# Patient Record
Sex: FEMALE | Race: OTHER
[De-identification: names, ages, dates, MRNs, and addresses within clinical notes are randomized per-mention and may not be internally consistent; named-entity substitution may affect disease eponyms.]

---

## 2020-04-27 ENCOUNTER — HOSPITAL ENCOUNTER (EMERGENCY)
Dept: HOSPITAL 63 - ER | Age: 30
Discharge: HOME | End: 2020-04-27
Payer: OTHER GOVERNMENT

## 2020-04-27 VITALS — WEIGHT: 108.69 LBS | BODY MASS INDEX: 18.56 KG/M2 | HEIGHT: 64 IN

## 2020-04-27 VITALS — SYSTOLIC BLOOD PRESSURE: 94 MMHG | DIASTOLIC BLOOD PRESSURE: 72 MMHG

## 2020-04-27 DIAGNOSIS — F32.9: Primary | ICD-10-CM

## 2020-04-27 DIAGNOSIS — Z88.1: ICD-10-CM

## 2020-04-27 DIAGNOSIS — Z91.5: ICD-10-CM

## 2020-04-27 DIAGNOSIS — Z88.0: ICD-10-CM

## 2020-04-27 PROCEDURE — 99281 EMR DPT VST MAYX REQ PHY/QHP: CPT

## 2020-04-27 NOTE — PHYS DOC
Past History


Past Medical History:  No Pertinent History


Past Surgical History:  No Surgical History


Smoking:  Non-smoker


Alcohol Use:  None


Drug Use:  None





General Adult


EDM:


Chief Complaint:  DEPRESSION





HPI:


HPI:





29-year-old female presents with report of increased depression. Patient reports

history of postpartum depression for which she had been on some Zoloft. Patient 

reports she currently is not taking any medication.  Patient has started to feel

overwhelmed taking care of 3 children including a 11 month old baby.  is 

currently deployed and they are continually extending his appointment. Patient 

does report history of self cutting. Denies any current lacerations. Denies 

suicidal or homicidal ideation. Denies fever or chills. Patient reports she 

currently does not have a family physician.





Review of Systems:


Review of Systems:





Constitutional: Denies fever or chills 


Eyes: Denies redness or eye pain 


HENT: Denies nasal congestion or sore throat


Respiratory: Denies cough or shortness of breath 


Cardiovascular: Denies chest pain or palpitations


GI: Denies abdominal pain, nausea, or vomiting


: Denies dysuria or hematuria


Musculoskeletal: Denies back pain or joint pain


Integument: Denies rash or skin lesions 


Neurologic: Denies headache, focal weakness or sensory changes





Complete systems were reviewed and found to be within normal limits, except as 

documented in this note.





Allergies:


Allergies:





Allergies








Coded Allergies Type Severity Reaction Last Updated Verified


 


  Penicillins Allergy Intermediate  1/2/19 Yes


 


  amoxicillin Allergy Intermediate  1/2/19 Yes











Physical Exam:


PE:





Constitutional: Well developed, well nourished, no acute distress, non-toxic 

appearance


HENT: Normocephalic, atraumatic, oropharynx moist


Eyes: PERRL, EOMI, conjunctiva normal, no discharge, no nystagmus


Neck: Normal range of motion, no tenderness, supple


Cardiovascular: Heart rate normal, regular rhythm


Lungs & Thorax:  Bilateral breath sounds clear to auscultation, no wheezing


Abdomen: Soft, no tenderness


Skin: Warm, dry, no erythema, no rash


Back: No tenderness, no CVA tenderness


Extremities: No tenderness, ROM intact, no edema


Neurologic: Alert and oriented X 3, normal motor function, normal sensory 

function, no focal deficits noted


Psychologic: Affect normal, judgment normal, denies suicidal or homicidal 

ideation





EKG:


EKG:


[]





Radiology/Procedures:


Radiology/Procedures:


[]





Course & Med Decision Making:


Course & Med Decision Making





Patient presents with history of present illness and physical exam consistent 

for depression. Patient denies suicidal or homicidal ideation. Patient 

neurologically intact. Physical exam unremarkable. 





Outpatient resources provided with PCP list and [facility] information.





Patient stable for discharge with outpatient follow-up with PCP/mental health. 

Discussed findings and plan with patient, who acknowledges understanding and 

agreement.





Dragon Disclaimer:


Dragon Disclaimer:


This electronic medical record was generated, in whole or in part, using a voice

 recognition dictation system.





Departure


Departure:


Impression:  


   Primary Impression:  


   Depression


   Qualified Codes:  F32.9 - Major depressive disorder, single episode, 

   unspecified


Disposition:  01 HOME, SELF-CARE


Condition:  STABLE


Referrals:  


PCP,NO (PCP)


Patient Instructions:  Depression, Adult, Easy-to-Read





Additional Instructions:  


Please follow up closely with Mental Health- [facility] referral provided.





Please call and make an appointment and establish care with a family physician.











REEMA MACEDO DO             Apr 27, 2020 10:49

## 2020-07-24 ENCOUNTER — HOSPITAL ENCOUNTER (EMERGENCY)
Dept: HOSPITAL 63 - ER | Age: 30
Discharge: HOME | End: 2020-07-24
Payer: OTHER GOVERNMENT

## 2020-07-24 VITALS — SYSTOLIC BLOOD PRESSURE: 100 MMHG | DIASTOLIC BLOOD PRESSURE: 66 MMHG

## 2020-07-24 VITALS — BODY MASS INDEX: 18.74 KG/M2 | HEIGHT: 64 IN | WEIGHT: 109.79 LBS

## 2020-07-24 DIAGNOSIS — Z88.0: ICD-10-CM

## 2020-07-24 DIAGNOSIS — N92.0: Primary | ICD-10-CM
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
## 2020-07-24 DIAGNOSIS — R55: ICD-10-CM
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
## 2020-07-24 DIAGNOSIS — Z88.1: ICD-10-CM
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
## 2020-07-24 LAB
ALBUMIN SERPL-MCNC: 4 G/DL (ref 3.4–5)
ALBUMIN/GLOB SERPL: 1.1 {RATIO} (ref 1–1.7)
ALP SERPL-CCNC: 100 U/L (ref 46–116)
ALT SERPL-CCNC: 22 U/L (ref 14–59)
ANION GAP SERPL CALC-SCNC: 9 MMOL/L (ref 6–14)
APTT PPP: YELLOW S
AST SERPL-CCNC: 15 U/L (ref 15–37)
BACTERIA #/AREA URNS HPF: (no result) /HPF
BASOPHILS # BLD AUTO: 0 X10^3/UL (ref 0–0.2)
BASOPHILS NFR BLD: 1 % (ref 0–3)
BILIRUB SERPL-MCNC: 0.3 MG/DL (ref 0.2–1)
BILIRUB UR QL STRIP: (no result)
BUN/CREAT SERPL: 19 (ref 6–20)
CA-I SERPL ISE-MCNC: 23 MG/DL (ref 7–20)
CALCIUM SERPL-MCNC: 9.1 MG/DL (ref 8.5–10.1)
CHLORIDE SERPL-SCNC: 103 MMOL/L (ref 98–107)
CO2 SERPL-SCNC: 26 MMOL/L (ref 21–32)
CREAT SERPL-MCNC: 1.2 MG/DL (ref 0.6–1)
EOSINOPHIL NFR BLD: 0.1 X10^3/UL (ref 0–0.7)
EOSINOPHIL NFR BLD: 2 % (ref 0–3)
ERYTHROCYTE [DISTWIDTH] IN BLOOD BY AUTOMATED COUNT: 12.5 % (ref 11.5–14.5)
FIBRINOGEN PPP-MCNC: (no result) MG/DL
GFR SERPLBLD BASED ON 1.73 SQ M-ARVRAT: 52.1 ML/MIN
GLOBULIN SER-MCNC: 3.8 G/DL (ref 2.2–3.8)
GLUCOSE SERPL-MCNC: 126 MG/DL (ref 70–99)
GLUCOSE UR STRIP-MCNC: (no result) MG/DL
HCT VFR BLD CALC: 41.6 % (ref 36–47)
HGB BLD-MCNC: 14.1 G/DL (ref 12–15.5)
LYMPHOCYTES # BLD: 2.6 X10^3/UL (ref 1–4.8)
LYMPHOCYTES NFR BLD AUTO: 40 % (ref 24–48)
MCH RBC QN AUTO: 31 PG (ref 25–35)
MCHC RBC AUTO-ENTMCNC: 34 G/DL (ref 31–37)
MCV RBC AUTO: 91 FL (ref 79–100)
MONO #: 0.5 X10^3/UL (ref 0–1.1)
MONOCYTES NFR BLD: 7 % (ref 0–9)
NEUT #: 3.4 X10^3UL (ref 1.8–7.7)
NEUTROPHILS NFR BLD AUTO: 51 % (ref 31–73)
NITRITE UR QL STRIP: (no result)
PLATELET # BLD AUTO: 217 X10^3/UL (ref 140–400)
POTASSIUM SERPL-SCNC: 3.5 MMOL/L (ref 3.5–5.1)
PROT SERPL-MCNC: 7.8 G/DL (ref 6.4–8.2)
RBC # BLD AUTO: 4.59 X10^6/UL (ref 3.5–5.4)
SODIUM SERPL-SCNC: 138 MMOL/L (ref 136–145)
SP GR UR STRIP: 1.02
SQUAMOUS #/AREA URNS LPF: (no result) /LPF
U PREG PATIENT: NEGATIVE
UROBILINOGEN UR-MCNC: 1 MG/DL
WBC # BLD AUTO: 6.7 X10^3/UL (ref 4–11)

## 2020-07-24 PROCEDURE — 81001 URINALYSIS AUTO W/SCOPE: CPT

## 2020-07-24 PROCEDURE — 99283 EMERGENCY DEPT VISIT LOW MDM: CPT

## 2020-07-24 PROCEDURE — 85025 COMPLETE CBC W/AUTO DIFF WBC: CPT

## 2020-07-24 PROCEDURE — 36415 COLL VENOUS BLD VENIPUNCTURE: CPT

## 2020-07-24 PROCEDURE — 87086 URINE CULTURE/COLONY COUNT: CPT

## 2020-07-24 PROCEDURE — 81025 URINE PREGNANCY TEST: CPT

## 2020-07-24 PROCEDURE — 80053 COMPREHEN METABOLIC PANEL: CPT

## 2020-07-24 NOTE — PHYS DOC
Past History


Past Medical History:  Depression, Other


Additional Past Medical Histor:  POST-PARTUM DEPRESSION


Past Surgical History:  Tubal ligation


Smoking:  Non-smoker


Alcohol Use:  None


Drug Use:  None





General Adult


EDM:


Chief Complaint:  VAGINAL BLEEDING





HPI:


HPI:


30-year-old female presents with heavy vaginal bleeding and syncopal episode.  

The patient is due to start her menses any day.  She started yesterday.  It is 

much heavier than usual.  She is going through a tampon and pad about every 

hour.  That is continued to today.  She was feeling lightheaded and fatigued 

earlier today.  She started to walk toward the door and fell forward onto the 

floor.  She did not lose consciousness because she can still hear things, but 

did have a short blacking out visually.  She rapidly came back to baseline.  

This was concerning so she decided come to emergency room.  She denies shortness

of breath, cough, chest pain, dysuria, increased urinary frequency.  Patient has

5 living children.  She had one miscarriage in the past.  She has not been 

sexually active because her  is out of state for the .





Review of Systems:


Review of Systems:


Constitutional: Fatigue.  Denies fever or chills 


Eyes:  Denies change in visual acuity 


HENT:  Denies nasal congestion or sore throat 


Respiratory:  Denies cough or shortness of breath 


Cardiovascular:  Denies chest pain or edema 


GI:  Denies abdominal pain, nausea, vomiting, bloody stools or diarrhea 


: Vaginal bleeding


Musculoskeletal:  Denies back pain or joint pain 


Integument:  Denies rash 


Neurologic:  Denies headache, focal weakness or sensory changes 


Endocrine:  Denies polyuria or polydipsia 


Lymphatic:  Denies swollen glands 


Psychiatric:  Denies depression or anxiety





Heart Score:


Risk Factors:


Risk Factors:  DM, Current or recent (<one month) smoker, HTN, HLP, family 

history of CAD, obesity.


Risk Scores:


Score 0 - 3:  2.5% MACE over next 6 weeks - Discharge Home


Score 4 - 6:  20.3% MACE over next 6 weeks - Admit for Clinical Observation


Score 7 - 10:  72.7% MACE over next 6 weeks - Early Invasive Strategies





Allergies:


Allergies:





Allergies








Coded Allergies Type Severity Reaction Last Updated Verified


 


  Penicillins Allergy Intermediate  1/2/19 Yes


 


  amoxicillin Allergy Intermediate  1/2/19 Yes











Physical Exam:


PE:





Constitutional: Well developed, thin, well nourished, no acute distress, non-

toxic appearance. []


HENT: Normocephalic, atraumatic, bilateral external ears normal, oropharynx 

moist, no oral exudates, nose normal. []


Eyes: PERRLA, EOMI, conjunctiva normal, no discharge. [] 


Neck: Normal range of motion, no tenderness, supple, no stridor. [] 


Cardiovascular:Heart rate regular rhythm, no murmur []


Lungs & Thorax:  Bilateral breath sounds clear to auscultation []


Abdomen: Bowel sounds normal, soft, no tenderness, no masses, no pulsatile 

masses. [] 


Skin: Warm, dry, no erythema, no rash. [] 


Back: No tenderness, no CVA tenderness. [] 


Extremities: No tenderness, no cyanosis, no clubbing, ROM intact, no edema. [] 


Neurologic: Alert and oriented X 3, normal motor function, normal sensory 

function, no focal deficits noted. []


Psychologic: Affect normal, judgement normal, mood normal. []





EKG:


EKG:


[]





Radiology/Procedures:


Radiology/Procedures:


[]





Course & Med Decision Making:


Course & Med Decision Making


Pertinent Labs and Imaging studies reviewed. (See chart for details)


The patient's labs are unremarkable.  This is likely just a heavy cycle for the 

patient.  I have advised that she wait and see how it goes over the next 24 to 

48 hours.  She is not anemic.  I have advised that she stay hydrated.  If her 

condition worsens or other concerning symptoms develop, she can return to the 

emergency room.  She is stable for discharge at this time.


[]





Dragon Disclaimer:


Dragon Disclaimer:


This electronic medical record was generated, in whole or in part, using a voice

 recognition dictation system.





Departure


Departure:


Impression:  


   Primary Impression:  


   Heavy menstrual bleeding


   Qualified Codes:  N92.0 - Excessive and frequent menstruation with regular 

   cycle


Disposition:  01 HOME/RESIDENCE PRIOR TO ADM


Condition:  STABLE


Referrals:  


PCP,NO (PCP)


Patient Instructions:  Menorrhagia, Easy-to-Read





Justification of Admission:


Justification of Admission:


Justification of Admission Dx:  N/A











JOSE GUADALUPE MACK DO                 Jul 24, 2020 17:37

## 2020-08-24 ENCOUNTER — HOSPITAL ENCOUNTER (EMERGENCY)
Dept: HOSPITAL 63 - ER | Age: 30
Discharge: HOME | End: 2020-08-24
Payer: OTHER GOVERNMENT

## 2020-08-24 VITALS — BODY MASS INDEX: 18.74 KG/M2 | WEIGHT: 109.79 LBS | HEIGHT: 64 IN

## 2020-08-24 VITALS — DIASTOLIC BLOOD PRESSURE: 66 MMHG | SYSTOLIC BLOOD PRESSURE: 112 MMHG

## 2020-08-24 DIAGNOSIS — Z88.0: ICD-10-CM

## 2020-08-24 DIAGNOSIS — Z88.1: ICD-10-CM

## 2020-08-24 DIAGNOSIS — N39.0: Primary | ICD-10-CM

## 2020-08-24 DIAGNOSIS — Z87.440: ICD-10-CM

## 2020-08-24 LAB
APTT PPP: YELLOW S
BACTERIA #/AREA URNS HPF: (no result) /HPF
BILIRUB UR QL STRIP: (no result)
FIBRINOGEN PPP-MCNC: (no result) MG/DL
GLUCOSE UR STRIP-MCNC: (no result) MG/DL
NITRITE UR QL STRIP: (no result)
RBC #/AREA URNS HPF: 0 /HPF (ref 0–2)
SP GR UR STRIP: 1.02
UROBILINOGEN UR-MCNC: 1 MG/DL
WBC #/AREA URNS HPF: (no result) /HPF (ref 0–4)

## 2020-08-24 PROCEDURE — 81001 URINALYSIS AUTO W/SCOPE: CPT

## 2020-08-24 PROCEDURE — 99283 EMERGENCY DEPT VISIT LOW MDM: CPT

## 2020-08-24 PROCEDURE — 81025 URINE PREGNANCY TEST: CPT

## 2020-08-24 PROCEDURE — 87086 URINE CULTURE/COLONY COUNT: CPT

## 2020-08-24 NOTE — PHYS DOC
Past History


Past Medical History:  Other


Additional Past Medical Histor:  POST-PARTUM DEPRESSION; used to self mutilate 

but not recently


Past Surgical History:  , Other


Additional Past Surgical Histo:  destin salpingectomy


Smoking:  Non-smoker


Alcohol Use:  Occasionally


Drug Use:  None





Adult General


Chief Complaint


Chief Complaint:  PAIN ON URINATION





HPI


HPI





Patient is a 30-year-old female presenting for UTI-like symptoms.  Patient 

reports her  who was in the  recently returned from long-term 

deployment.  She admits being sexually active recently and has had feelings of 

UTI-like symptoms such as increased urinary frequency and dysuria.  Denies any 

vaginal discharge, vaginal bleeding, or fever.  Patient has had UTIs in the past

and reports this feels identical to previous





Review of Systems


Review of Systems


Fourteen body systems of review of systems have been reviewed. See HPI for 

pertinent positives and negative responses, other wise all other systems are 

negative, non-pertinent or non-contributory





Allergies


Allergies





Allergies








Coded Allergies Type Severity Reaction Last Updated Verified


 


  Penicillins Allergy Intermediate  20 Yes


 


  amoxicillin Allergy Intermediate  20 Yes











Physical Exam


Physical Exam


Constitutional: Well developed, well nourished, no acute distress, non-toxic 

appearance. 


HENT: Normocephalic, atraumatic, bilateral external ears normal, oropharynx 

moist, no oral exudates, nose normal. 


Eyes: PERRLA, EOMI, conjunctiva normal, no discharge.  


Neck: Normal range of motion, no tenderness, supple, no stridor.  


Cardiovascular: Heart rate regular, sinus rhythm, no murmurs rubs or gallops


Lungs & Thorax:  Bilateral breath sounds clear to auscultation 


Abdomen: Bowel sounds normal, soft, mild suprapubic tenderness, no guarding, no 

rebound, no masses, no pulsatile masses.  Nonsurgical abdomen, no peritoneal 

signs


Skin: Warm, dry, no erythema, no rash.  


Back: No tenderness, no CVA tenderness.  


Extremities: No tenderness, no cyanosis, no clubbing, ROM intact, no edema.  


Neurologic: Alert and oriented X 3, grossly normal motor & sensory function, no 

focal deficits noted. 


Psychologic: Affect normal, judgement normal, mood normal.





Current Patient Data


Vital Signs





                                   Vital Signs








  Date Time  Temp Pulse Resp B/P (MAP) Pulse Ox O2 Delivery O2 Flow Rate FiO2


 


20 20:40 98.5 78 18 112/66 (81) 100 Room Air  








Lab Results





                                Laboratory Tests








Test


 20


20:50


 


Urine Collection Type Void  


 


Urine Color Yellow  


 


Urine Clarity Hazy  


 


Urine pH 7.0  


 


Urine Specific Gravity 1.025  


 


Urine Protein


 Neg


(NEG-TRACE)


 


Urine Glucose (UA)


 Neg mg/dL


(NEG)


 


Urine Ketones (Stick)


 Neg mg/dL


(NEG)


 


Urine Blood Trace (NEG)  


 


Urine Nitrite Neg (NEG)  


 


Urine Bilirubin Neg (NEG)  


 


Urine Urobilinogen Dipstick


 1.0 mg/dL (0.2


mg/dL)


 


Urine Leukocyte Esterase Small (NEG)  


 


Urine RBC 0 /HPF (0-2)  


 


Urine WBC


 20-40 /HPF


(0-4)


 


Urine Bacteria


 Few /HPF


(0-FEW)











EKG


EKG


[]





Radiology/Procedures


Radiology/Procedures


[]





Course & Med Decision Making


Course & Med Decision Making


ABCs unremarkable


Comprehensive history and physical exam obtained, pertinent diagnostic work-up 

obtain


Clinically well-appearing patient with history and findings consistent with 

simple cystitis


Discussed most likely diagnosis of simple UTI, discussed treatment with Macrobid

 and close PCP follow-up, patient reports having previously scheduled 

appointment with PCP in upcoming 72 hours


Strict return precautions discussed with good understanding by patient, all 

questions and concerns addressed prior to ER departure





Dragon Disclaimer


Dragon Disclaimer


This electronic medical record was generated, in whole or in part, using a voice

 recognition dictation system.





Departure


Departure:


Impression:  


   Primary Impression:  


   UTI (urinary tract infection)


Disposition:  01 HOME/RESIDENCE PRIOR TO ADM


Condition:  STABLE


Referrals:  


LYNNE JIN MD (PCP)


Patient Instructions:  Urinary Tract Infection





Additional Instructions:  


As discussed prior to ER departure, please follow-up with your PCP this upcoming

 Thursday as previously scheduled


You have been treated for an uncomplicated UTI today, there is low suspicion for

 other potentially infectious causes at this time.


Please review your ER course with your primary care physician, discuss your 

symptomology whether it has fully resolved or not, there might be an indication 

for further diagnostic work-up such as pelvic exam etc.


Strict return precautions were discussed prior to your discharge, if any of 

these concerning signs or symptoms such as fever, chills, vaginal discharge, 

vaginal bleeding etc. arise, please call your PCP or re-present to our ER for 

formal evaluation and further medical intervention


It was a pleasure to take care of you and I wish you a speedy recovery!


Scripts


Nitrofurantoin Monohyd/M-Cryst (MACROBID 100 MG CAPSULE) 100 Mg Capsule


1 CAP PO BID for UTI for 5 Days, #9 CAP 0 Refills


   Prov: SHERRI DICKSON DO         20





Justification of Admission:


Justification of Admission:


Justification of Admission Dx:  N/A











SHERRI DICKSON DO                 Aug 24, 2020 21:57

## 2020-10-15 ENCOUNTER — HOSPITAL ENCOUNTER (EMERGENCY)
Dept: HOSPITAL 63 - ER | Age: 30
LOS: 1 days | Discharge: HOME | End: 2020-10-16
Payer: OTHER GOVERNMENT

## 2020-10-15 ENCOUNTER — HOSPITAL ENCOUNTER (EMERGENCY)
Dept: HOSPITAL 63 - ER | Age: 30
Discharge: HOME | End: 2020-10-15
Payer: OTHER GOVERNMENT

## 2020-10-15 VITALS — HEIGHT: 64 IN | BODY MASS INDEX: 18.07 KG/M2 | WEIGHT: 105.82 LBS

## 2020-10-15 VITALS — HEIGHT: 64 IN | WEIGHT: 105.82 LBS | BODY MASS INDEX: 18.07 KG/M2

## 2020-10-15 VITALS — DIASTOLIC BLOOD PRESSURE: 71 MMHG | SYSTOLIC BLOOD PRESSURE: 110 MMHG

## 2020-10-15 DIAGNOSIS — N12: Primary | ICD-10-CM

## 2020-10-15 DIAGNOSIS — K52.9: Primary | ICD-10-CM

## 2020-10-15 DIAGNOSIS — Z98.890: ICD-10-CM

## 2020-10-15 DIAGNOSIS — Z88.0: ICD-10-CM

## 2020-10-15 DIAGNOSIS — R30.0: ICD-10-CM

## 2020-10-15 DIAGNOSIS — F17.200: ICD-10-CM

## 2020-10-15 DIAGNOSIS — Z88.1: ICD-10-CM

## 2020-10-15 DIAGNOSIS — Z98.51: ICD-10-CM

## 2020-10-15 LAB
ALBUMIN SERPL-MCNC: 4.1 G/DL (ref 3.4–5)
ALBUMIN/GLOB SERPL: 1 {RATIO} (ref 1–1.7)
ALP SERPL-CCNC: 79 U/L (ref 46–116)
ALT SERPL-CCNC: 21 U/L (ref 14–59)
AMPHETAMINE/METHAMPHETAMINE: (no result)
ANION GAP SERPL CALC-SCNC: 12 MMOL/L (ref 6–14)
APTT PPP: YELLOW S
AST SERPL-CCNC: 15 U/L (ref 15–37)
BACTERIA #/AREA URNS HPF: 0 /HPF
BARBITURATES UR-MCNC: (no result) UG/ML
BASOPHILS # BLD AUTO: 0 X10^3/UL (ref 0–0.2)
BASOPHILS NFR BLD: 0 % (ref 0–3)
BENZODIAZ UR-MCNC: (no result) UG/L
BILIRUB SERPL-MCNC: 0.6 MG/DL (ref 0.2–1)
BILIRUB UR QL STRIP: (no result)
BUN/CREAT SERPL: 17 (ref 6–20)
CA-I SERPL ISE-MCNC: 17 MG/DL (ref 7–20)
CALCIUM SERPL-MCNC: 9.3 MG/DL (ref 8.5–10.1)
CANNABINOIDS UR-MCNC: (no result) UG/L
CHLORIDE SERPL-SCNC: 101 MMOL/L (ref 98–107)
CO2 SERPL-SCNC: 23 MMOL/L (ref 21–32)
COCAINE UR-MCNC: (no result) NG/ML
CREAT SERPL-MCNC: 1 MG/DL (ref 0.6–1)
EOSINOPHIL NFR BLD: 0 % (ref 0–3)
EOSINOPHIL NFR BLD: 0 X10^3/UL (ref 0–0.7)
ERYTHROCYTE [DISTWIDTH] IN BLOOD BY AUTOMATED COUNT: 12.1 % (ref 11.5–14.5)
FIBRINOGEN PPP-MCNC: CLEAR MG/DL
GFR SERPLBLD BASED ON 1.73 SQ M-ARVRAT: 64.3 ML/MIN
GLOBULIN SER-MCNC: 4.1 G/DL (ref 2.2–3.8)
GLUCOSE SERPL-MCNC: 85 MG/DL (ref 70–99)
GLUCOSE UR STRIP-MCNC: (no result) MG/DL
HCT VFR BLD CALC: 42 % (ref 36–47)
HGB BLD-MCNC: 14 G/DL (ref 12–15.5)
LYMPHOCYTES # BLD: 0.7 X10^3/UL (ref 1–4.8)
LYMPHOCYTES NFR BLD AUTO: 9 % (ref 24–48)
MAGNESIUM SERPL-MCNC: 1.9 MG/DL (ref 1.8–2.4)
MCH RBC QN AUTO: 31 PG (ref 25–35)
MCHC RBC AUTO-ENTMCNC: 33 G/DL (ref 31–37)
MCV RBC AUTO: 92 FL (ref 79–100)
METHADONE SERPL-MCNC: (no result) NG/ML
MONO #: 0.4 X10^3/UL (ref 0–1.1)
MONOCYTES NFR BLD: 5 % (ref 0–9)
NEUT #: 7 X10^3UL (ref 1.8–7.7)
NEUTROPHILS NFR BLD AUTO: 86 % (ref 31–73)
NITRITE UR QL STRIP: (no result)
OPIATES UR-MCNC: (no result) NG/ML
PCP SERPL-MCNC: (no result) MG/DL
PLATELET # BLD AUTO: 194 X10^3/UL (ref 140–400)
POTASSIUM SERPL-SCNC: 3.6 MMOL/L (ref 3.5–5.1)
PROT SERPL-MCNC: 8.2 G/DL (ref 6.4–8.2)
RBC # BLD AUTO: 4.59 X10^6/UL (ref 3.5–5.4)
RBC #/AREA URNS HPF: (no result) /HPF (ref 0–2)
SODIUM SERPL-SCNC: 136 MMOL/L (ref 136–145)
SP GR UR STRIP: 1.02
SQUAMOUS #/AREA URNS LPF: (no result) /LPF
U PREG PATIENT: NEGATIVE
UROBILINOGEN UR-MCNC: 0.2 MG/DL
WBC # BLD AUTO: 8.1 X10^3/UL (ref 4–11)
WBC #/AREA URNS HPF: (no result) /HPF (ref 0–4)

## 2020-10-15 PROCEDURE — 80307 DRUG TEST PRSMV CHEM ANLYZR: CPT

## 2020-10-15 PROCEDURE — 83735 ASSAY OF MAGNESIUM: CPT

## 2020-10-15 PROCEDURE — 96365 THER/PROPH/DIAG IV INF INIT: CPT

## 2020-10-15 PROCEDURE — 99284 EMERGENCY DEPT VISIT MOD MDM: CPT

## 2020-10-15 PROCEDURE — 82550 ASSAY OF CK (CPK): CPT

## 2020-10-15 PROCEDURE — 81001 URINALYSIS AUTO W/SCOPE: CPT

## 2020-10-15 PROCEDURE — 96375 TX/PRO/DX INJ NEW DRUG ADDON: CPT

## 2020-10-15 PROCEDURE — 96374 THER/PROPH/DIAG INJ IV PUSH: CPT

## 2020-10-15 PROCEDURE — 99285 EMERGENCY DEPT VISIT HI MDM: CPT

## 2020-10-15 PROCEDURE — 36415 COLL VENOUS BLD VENIPUNCTURE: CPT

## 2020-10-15 PROCEDURE — 83690 ASSAY OF LIPASE: CPT

## 2020-10-15 PROCEDURE — 96361 HYDRATE IV INFUSION ADD-ON: CPT

## 2020-10-15 PROCEDURE — 83605 ASSAY OF LACTIC ACID: CPT

## 2020-10-15 PROCEDURE — 81025 URINE PREGNANCY TEST: CPT

## 2020-10-15 PROCEDURE — 85025 COMPLETE CBC W/AUTO DIFF WBC: CPT

## 2020-10-15 PROCEDURE — 87086 URINE CULTURE/COLONY COUNT: CPT

## 2020-10-15 PROCEDURE — 80053 COMPREHEN METABOLIC PANEL: CPT

## 2020-10-15 PROCEDURE — 74177 CT ABD & PELVIS W/CONTRAST: CPT

## 2020-10-15 NOTE — RAD
EXAM: CT ABDOMEN/PELVIS WITH CONTRAST.

 

HISTORY: Urinary tract infection, flank pain, vomiting.

 

TECHNIQUE: Computed tomography of the abdomen and pelvis was performed 

after the intravenous administration of iodinated contrast. One or more of

the following individualized dose reduction techniques were utilized for 

this examination:  

1. Automated exposure control.  

2. Adjustment of the mA and/or kV according to patient size.  

3. Use of iterative reconstruction technique.

 

COMPARISON: None.

 

FINDINGS: Lung windows through the visualized portions of the bases reveal

no abnormality. Bone windows reveal no suspicious lesions. There is a 

moderate pectus excavatum deformity. There is some mass effect on the 

right ventricle.

 

The liver, gallbladder, spleen, pancreas and adrenal glands are 

unremarkable. There are multiple bilateral renal calculi measuring up to 7

mm on the left and 3 mm on the right. Subcentimeter cysts bilaterally 

appear benign. There is mild bilateral cortical scarring versus persistent

fetal lobulation.

 

Wall thickening of the right colon is consistent with colitis. The left 

colon does not appear involved. A small amount of free pelvic fluid is 

likely physiologic. A myometrial fibroid at the uterine fundus measures 

1.3 cm. The appendix is not inflamed. There is no small bowel obstruction.

 

The central mesenteric vasculature is widely patent.

 

IMPRESSION: 

1. At least moderate right colitis. Infectious, inflammatory or ischemic 

etiologies could be considered in this distribution.

2. Multiple bilateral renal calculi measure up to 7 mm on the left.

3. 1.2 cm myometrial fibroid at the uterine fundus.

4. A moderate pectus excavatum deformity with some mass effect on the 

right ventricle.

 

Electronically signed by: DAVID Mccoy MD (10/15/2020 11:16 PM) 

[facility]

## 2020-10-15 NOTE — PHYS DOC
Past History


Past Medical History:  Other


Additional Past Medical Histor:  HEART MURMOR


Past Surgical History:  , Tubal ligation, Other


Additional Past Surgical Histo:  UMBRELLA IN HEART


Smoking:  Non-smoker


Alcohol Use:  None


Drug Use:  None





General Adult


EDM:


Chief Complaint:  PAIN ON URINATION





HPI:


HPI:


30-year-old female presents with vomiting.  She was seen earlier today by my 

colleague and diagnosed with a urinary tract infection.  The patient had joint 

pain which is now resolved.  She went home and she is unable to keep down 

anything including fluids.  She tried her Zofran prescription and it has not 

worked.  She does not have a fever.  She is just worried that her urinary tract 

infection is worse than she thought might need to be admitted for vomiting.  She

would prefer not to be admitted but is worried about not being able to keep down

fluids.  See note from same day for more details of her previous visit.





Review of Systems:


Review of Systems:


Constitutional:  Denies fever or chills 


Eyes:  Denies change in visual acuity 


HENT:  Denies nasal congestion or sore throat 


Respiratory:  Denies cough or shortness of breath 


Cardiovascular:  Denies chest pain or edema 


GI:  nausea, vomiting.  Denies abdominal pain, bloody stools or diarrhea 


: Dysuria


Musculoskeletal: Right flank pain


Integument:  Denies rash 


Neurologic:  Denies headache, focal weakness or sensory changes 


Endocrine:  Denies polyuria or polydipsia 


Lymphatic:  Denies swollen glands 


Psychiatric:  Denies depression or anxiety





Heart Score:


Risk Factors:


Risk Factors:  DM, Current or recent (<one month) smoker, HTN, HLP, family 

history of CAD, obesity.


Risk Scores:


Score 0 - 3:  2.5% MACE over next 6 weeks - Discharge Home


Score 4 - 6:  20.3% MACE over next 6 weeks - Admit for Clinical Observation


Score 7 - 10:  72.7% MACE over next 6 weeks - Early Invasive Strategies





Allergies:


Allergies:





Allergies








Coded Allergies Type Severity Reaction Last Updated Verified


 


  Penicillins Allergy Intermediate  20 Yes


 


  amoxicillin Allergy Intermediate  20 Yes











Physical Exam:


PE:





Constitutional: Well developed, well nourished, no acute distress, non-toxic 

appearance. []


HENT: Normocephalic, atraumatic, bilateral external ears normal, oropharynx 

moist, no oral exudates, nose normal. []


Eyes: PERRLA, EOMI, conjunctiva normal, no discharge. [] 


Neck: Normal range of motion, no tenderness, supple, no stridor. [] 


Cardiovascular:Heart rate regular rhythm, no murmur []


Lungs & Thorax:  Bilateral breath sounds clear to auscultation []


Abdomen: Bowel sounds normal, soft, no tenderness, no masses, no pulsatile 

masses. [] 


Skin: Warm, dry, no erythema, no rash. [] 


Back: No tenderness, no CVA tenderness. [] 


Extremities: No tenderness, no cyanosis, no clubbing, ROM intact, no edema. [] 


Neurologic: Alert and oriented X 3, normal motor function, normal sensory 

function, no focal deficits noted. []


Psychologic: Affect normal, judgement normal, mood normal. []





EKG:


EKG:


[]





Radiology/Procedures:


Radiology/Procedures:


[]


Impressions:


EXAM: CT ABDOMEN/PELVIS WITH CONTRAST.


 


HISTORY: Urinary tract infection, flank pain, vomiting.


 


TECHNIQUE: Computed tomography of the abdomen and pelvis was performed 


after the intravenous administration of iodinated contrast. One or more of


the following individualized dose reduction techniques were utilized for 


this examination:  


1. Automated exposure control.  


2. Adjustment of the mA and/or kV according to patient size.  


3. Use of iterative reconstruction technique.


 


COMPARISON: None.


 


FINDINGS: Lung windows through the visualized portions of the bases reveal


no abnormality. Bone windows reveal no suspicious lesions. There is a 


moderate pectus excavatum deformity. There is some mass effect on the 


right ventricle.


 


The liver, gallbladder, spleen, pancreas and adrenal glands are 


unremarkable. There are multiple bilateral renal calculi measuring up to 7


mm on the left and 3 mm on the right. Subcentimeter cysts bilaterally 


appear benign. There is mild bilateral cortical scarring versus persistent


fetal lobulation.


 


Wall thickening of the right colon is consistent with colitis. The left 


colon does not appear involved. A small amount of free pelvic fluid is 


likely physiologic. A myometrial fibroid at the uterine fundus measures 


1.3 cm. The appendix is not inflamed. There is no small bowel obstruction.


 


The central mesenteric vasculature is widely patent.


 


IMPRESSION: 


1. At least moderate right colitis. Infectious, inflammatory or ischemic 


etiologies could be considered in this distribution.


2. Multiple bilateral renal calculi measure up to 7 mm on the left.


3. 1.2 cm myometrial fibroid at the uterine fundus.


4. A moderate pectus excavatum deformity with some mass effect on the 


right ventricle.


 


Electronically signed by: DAVID Mccoy MD (10/15/2020 11:16 PM) 


[facility]














DICTATED AND SIGNED BY:     PRUDENCE MCCOY MD


DATE:     10/15/20 6659





CC: JOSE GUADALUPE MACK DO; LYNNE JIN MD ~





Course & Med Decision Making:


Course & Med Decision Making


Pertinent Labs and Imaging studies reviewed. (See chart for details)


The patient did not have a CT scan earlier today so I have ordered this.  I will

 try Compazine and Benadryl for her vomiting.  Her penicillin allergy is a rash 

and it was many years ago.  I will give her a gram of Rocephin IV for her 

urinary tract infection.  Patient CT scan showed a right colitis.  The patient's

 labs from earlier today are unremarkable.  I have ordered a lactic acid.  

Lactic acid is 0.7.  Believe this is most likely infectious.  I will treat her 

with 7 days of Augmentin.  The patient told me her penicillin allergy was 

decades ago and was just a rash.  She has had medications with penicillin and it

 and had no difficulty.  Have advised that she continue her Bactrim as well.  

She is stable for discharge at this time.  If her condition worsens or new 

symptoms develop, the patient will return to the emergency room for admission 

and further treatment.


[]





Dragon Disclaimer:


Dragon Disclaimer:


This electronic medical record was generated, in whole or in part, using a voice

 recognition dictation system.





Departure


Departure:


Impression:  


   Primary Impression:  


   Colitis


Disposition:  01 DC HOME SELF CARE/HOMELESS


Condition:  STABLE


Referrals:  


LYNNE JIN MD (PCP)


Patient Instructions:  Colitis


Scripts


Amoxicillin/Potassium Clav (AUGMENTIN 875-125 TABLET) 1 Each Tablet


1 TAB PO BID for colitis for 7 Days, #14 TAB 0 Refills


   Prov: JOSE GUADALUPE MACK DO         10/16/20











JOSE GUADALUPE MACK DO                 Oct 15, 2020 22:05

## 2020-10-15 NOTE — PHYS DOC
Past History


Past Medical History:  Other


Additional Past Medical Histor:  HEART MURMOR


Past Surgical History:  , Tubal ligation, Other


Additional Past Surgical Histo:  UMBRELLA IN HEART


Smoking:  Non-smoker


Additional Smoking Information:  


VAPES NICOTINE OCCASIONALLY


Alcohol Use:  None


Drug Use:  None





General Adult


EDM:


Chief Complaint:  NAUSEA/VOMITING/DIARRHEA





HPI:


HPI:


30-year-old  female who denies any past medical history, presents the ED 

with complaints of joint pain in her fingers and toes and hips described as 

aching that started yesterday, now with left low back pain, nausea, nonbloody 

nonbilious vomiting and loose watery diarrhea.  Patient states she started her 

menses 3 days ago.  Reports history of joint pain, vomiting and diarrhea 

occurring with her menses for the past few months.  Patient is concerned she may

have an estrogen problem because ever since her tubal ligation tubal ligation 

(17 months ago), her menses have caused significant pain/joint pain/n/v/d.  Also

states her  recently returned from  service, reports increased 

sexual activity - no dyspareunia or abnormal vaginal discharge itching or odor.





Review of Systems:


Review of Systems:


Constitutional:  Denies fever or chills 


Eyes:  Denies change in visual acuity 


HENT:  Denies nasal congestion or sore throat 


Respiratory:  Denies cough or shortness of breath 


Cardiovascular:  Denies chest pain or edema 


GI:  Denies melena, hematochezia, hematemesis


: Denies hematuria


Musculoskeletal:  Denies back pain or joint pain 


Integument:  Denies rash 


Neurologic:  Denies headache, focal weakness or sensory changes 


Endocrine:  Denies polyuria or polydipsia 


Lymphatic:  Denies swollen glands 


Psychiatric:  Denies depression or anxiety





Heart Score:


Risk Factors:


Risk Factors:  DM, Current or recent (<one month) smoker, HTN, HLP, family 

history of CAD, obesity.


Risk Scores:


Score 0 - 3:  2.5% MACE over next 6 weeks - Discharge Home


Score 4 - 6:  20.3% MACE over next 6 weeks - Admit for Clinical Observation


Score 7 - 10:  72.7% MACE over next 6 weeks - Early Invasive Strategies





Current Medications:


Current Meds:





Current Medications








 Medications


  (Trade)  Dose


 Ordered  Sig/Patrick  Start Time


 Stop Time Status Last Admin


Dose Admin


 


 Ondansetron HCl


  (Zofran Odt)  4 mg  1X  ONCE  10/15/20 11:45


 10/15/20 11:45 DC  





 


 Ondansetron HCl


  (Zofran)  4 mg  1X  ONCE  10/15/20 12:00


 10/15/20 12:01 DC 10/15/20 11:52


4 MG


 


 Sodium Chloride  1,000 ml @ 


 1,000 mls/hr  1X  ONCE  10/15/20 12:00


 10/15/20 12:59  10/15/20 11:50


1,000 MLS/HR











Allergies:


Allergies:





Allergies








Coded Allergies Type Severity Reaction Last Updated Verified


 


  Penicillins Allergy Intermediate  20 Yes


 


  amoxicillin Allergy Intermediate  20 Yes











Physical Exam:


PE:





Constitutional: Well developed, well nourished, non-toxic appearance. []


HENT: Normocephalic, atraumatic, bilateral external ears normal, oropharynx 

moist, no oral exudates, nose normal. []


Eyes: EOMI, conjunctiva normal, no discharge. [] 


Neck: Normal range of motion, , supple, no stridor. [] 


Cardiovascular:Heart rate regular rhythm, no murmur []


Lungs & Thorax:  Bilateral breath sounds clear to auscultation []


Abdomen: Bowel sounds normal, soft, no tenderness, no masses, no pulsatile 

masses. [] 


Skin: Warm, dry, no erythema, no rash. [] 


Back: No tenderness, +left CVA tenderness. [] 


Extremities: No tenderness, no cyanosis, no clubbing, ROM intact, no edema. [] 


Neurologic: Alert and oriented X 3, normal motor function, normal sensory 

function, no focal deficits noted. []


Psychologic: Affect normal, judgement normal, mood normal. []





Current Patient Data:


Labs:





                                Laboratory Tests








Test


 10/15/20


11:30 10/15/20


11:50 10/15/20


12:06


 


Urine Collection Type Unknown    


 


Urine Color Yellow    


 


Urine Clarity Clear    


 


Urine pH 6.0    


 


Urine Specific Gravity 1.020    


 


Urine Protein


 Trace


(NEG-TRACE) 


 





 


Urine Glucose (UA)


 Neg mg/dL


(NEG) 


 





 


Urine Ketones (Stick)


 Neg mg/dL


(NEG) 


 





 


Urine Blood Small (NEG)    


 


Urine Nitrite Pos (NEG)    


 


Urine Bilirubin Neg (NEG)    


 


Urine Urobilinogen Dipstick


 0.2 mg/dL (0.2


mg/dL) 


 





 


Urine Leukocyte Esterase Trace (NEG)    


 


Urine RBC


 3-5 /HPF (0-2)


 


 





 


Urine WBC


 5-10 /HPF


(0-4) 


 





 


Urine Squamous Epithelial


Cells Mod /LPF  


 


 





 


Urine Bacteria


 0 /HPF (0-FEW)


 


 





 


White Blood Count


 


 8.1 x10^3/uL


(4.0-11.0) 





 


Red Blood Count


 


 4.59 x10^6/uL


(3.50-5.40) 





 


Hemoglobin


 


 14.0 g/dL


(12.0-15.5) 





 


Hematocrit


 


 42.0 %


(36.0-47.0) 





 


Mean Corpuscular Volume


 


 92 fL ()


 





 


Mean Corpuscular Hemoglobin  31 pg (25-35)   


 


Mean Corpuscular Hemoglobin


Concent 


 33 g/dL


(31-37) 





 


Red Cell Distribution Width


 


 12.1 %


(11.5-14.5) 





 


Platelet Count


 


 194 x10^3/uL


(140-400) 





 


Neutrophils (%) (Auto)  86 % (31-73)  H 


 


Lymphocytes (%) (Auto)  9 % (24-48)  L 


 


Monocytes (%) (Auto)  5 % (0-9)   


 


Eosinophils (%) (Auto)  0 % (0-3)   


 


Basophils (%) (Auto)  0 % (0-3)   


 


Neutrophils # (Auto)


 


 7.0 x10^3uL


(1.8-7.7) 





 


Lymphocytes # (Auto)


 


 0.7 x10^3/uL


(1.0-4.8)  L 





 


Monocytes # (Auto)


 


 0.4 x10^3/uL


(0.0-1.1) 





 


Eosinophils # (Auto)


 


 0.0 x10^3/uL


(0.0-0.7) 





 


Basophils # (Auto)


 


 0.0 x10^3/uL


(0.0-0.2) 





 


Urine Pregnancy Test


 


 


 Negative (NEG)











Vital Signs:





                                   Vital Signs








  Date Time  Temp Pulse Resp B/P (MAP) Pulse Ox O2 Delivery O2 Flow Rate FiO2


 


10/15/20 11:33 98.5 114 18 114/74 (87) 98 Room Air  











EKG:


EKG:


[]





Radiology/Procedures:


Radiology/Procedures:


[]





Course & Med Decision Making:


Course & Med Decision Making


Pertinent Labs and Imaging studies reviewed. (See chart for details)





Concern for left-sided pyelonephritis in the setting of nausea, vomiting and 

flulike symptoms, does not meet sirs criteria.  Patient will be prescribed 

Bactrim for 14 days and Zofran (had macrobid in August, PCN allergic). Repeat HR

 96-improved with IVFs. Very strict ED return precautions given for dehydration,

 worsening fever or fatigue.  Encouraged urgent outpatient follow-up with PMD.  

Life-threatening processes were considered but are low suspicion at this time, 

given history and physical exam. Pt was educated on all prescription medications

 and adverse effects.  All patient's questions were answered and pt was stable 

at time of discharge.





Life/limb-threatening differential includes but is not limited to, aortic 

dissection/aneurysm, cauda equina syndrome, transverse myelitis, spinal cord 

compression, epidural abscess or hematoma, osteomyelitis, disc herniation, 

surgical abdomen, stable or unstable fracture, renal colic/urosepsis, 

musculoskeletal injury, traumatic injury, intraabdominal or pelvic bleeding, 





I spoken with the patient and her caregivers.  I explained the patient's 

condition, diagnoses and treatment plan based on the information available to me

 at this time.  I have answered the patient and her caregiver's questions and 

addressed any concerns.  The patient and her caregivers have a good 

understanding of patient's diagnosis, condition and treatment plan as can be 

expected at this point.  Vital signs have been stable.  Patient's condition is 

stable and appropriate for discharge from the emergency department. 





Patient will pursue further outpatient evaluation with primary care physician or

 other designated or consulting physician as outlined in the discharge 

instructions.  The patient and/or caregivers are agreeable to this plan of care 

and follow-up instructions have been explained in detail.  The patient and/or 

caregivers have received these instructions in written form and have expressed 

an understanding of the discharge instructions.  The patient and/or caregivers 

are aware that any significant change of condition or worsening of symptoms 

should prompt immediate return to this or the closest emergency department or 

call to 911.





[facility] Disclaimer:


Dragon Disclaimer:


This electronic medical record was generated, in whole or in part, using a voice

 recognition dictation system.





Departure


Departure:


Impression:  


   Primary Impression:  


   Pyelonephritis


Disposition:  01 DC HOME SELF CARE/HOMELESS


Condition:  STABLE


Referrals:  


LYNNE JIN MD (PCP)


in 24-48 hours for re-check


Patient Instructions:  Pyelonephritis, Adult





Additional Instructions:  


EMERGENCY DEPARTMENT GENERAL DISCHARGE INSTRUCTIONS





Thank you for coming to [facility] (ED) today and trusting us

 with you 


care.  We trust that you had a positivie experience in our Emergency Department.

  If you 


wish to speak to the department management, you may call the director at 

[phone].





YOUR FOLLOW UP INSTRUCTIONS ARE AS FOLLOWS:





1.  Do you have a private Doctor?  If you do not have a private doctor, please 

ask for a 


resource list of physicians or clinics that may be able to assist you with 

follow up care.





2.  The Emergency Physician has interpreted your x-rays.  The X-Ray specialist 

will also 


review them.  If there is a change in the findings, you will be notified in 48 

hours when at 


all possible.





3.  A lab test or culture has been done, your results will be reviewed and you 

will be 


notified if you need a change in treatment.





ADDITIONAL INSTRUCTIONS AND INFORMATION:





1.  Your care today has been supervised by a physician who is specially trained 

in emergency 


care.  Many problems require more than one evaluation for a complete diagnosis 

and 


treatment.  We recommend that you schedule your follow up appointment as 

recommended to 


ensure complete treatment of you illness or injury.  If you are unable to obtain

 follow up 


care and continue to have a problem, or if your condition worsens, we recommend 

that you 


return to the ED.





2.  We are not able to safely determine your condition over the phone nor are we

 able to 


give sound medical advice over the phone.  For these safety reasons, if you call

 for medical 


advice we will ask you to come to the ED for further evaluation.





3.  If you have any questions regarding these discharge instructions please call

 the ED at 


[phone].





SAFETY INFORMATION:





In the interest of safety, wellness, and injury prevention; we encourage you to 

wear your 


sealbelt, if you smoke; quite smoking, and we encourage family to use a 

protective helmet 


for bicycling and other sporting events that present an increased risk for head 

injury.





IF YOUR SYMPTOMS WORSEN OR NEW SYMPTOMS DEVELOP, OR YOU HAVE CONCERNS ABOUT YOUR

 CONDITION; 


OR IF YOUR CONDITION WORSENS WHILE YOU ARE WAITING FOR YOUR FOLLOW UP 

APPOINTMENT; EITHER 


CONTACT YOUR PRIMARY CARE DOCTOR, THE PHYSICIAN WHOSE NAME AND NUMBER YOU WERE 

GIVEN, OR 


RETURN TO THE ED IMMEDIATELY.


Scripts


Sulfamethoxazole/Trimethoprim (BACTRIM DS TABLET) 1 Each Tablet


1 TAB PO BID for uti for 14 Days, #28 TAB 0 Refills


   Prov: FRANSICO ANDRADE DO         10/15/20 


Ondansetron (ONDANSETRON ODT) 4 Mg Tab.rapdis


4 MG PO Q6HRS for Nausea/Vomiting, #15 TAB


   Prov: FRANSICO ANDRADE DO         10/15/20











FRANSICO ANDRADE DO               Oct 15, 2020 12:32

## 2020-10-16 VITALS — DIASTOLIC BLOOD PRESSURE: 59 MMHG | SYSTOLIC BLOOD PRESSURE: 92 MMHG

## 2020-12-17 ENCOUNTER — HOSPITAL ENCOUNTER (EMERGENCY)
Dept: HOSPITAL 63 - ER | Age: 30
LOS: 1 days | Discharge: HOME | End: 2020-12-18
Payer: OTHER GOVERNMENT

## 2020-12-17 VITALS — BODY MASS INDEX: 18.52 KG/M2 | HEIGHT: 64 IN | WEIGHT: 108.47 LBS

## 2020-12-17 DIAGNOSIS — Z88.0: ICD-10-CM

## 2020-12-17 DIAGNOSIS — B34.9: Primary | ICD-10-CM

## 2020-12-17 DIAGNOSIS — Z88.1: ICD-10-CM

## 2020-12-17 PROCEDURE — 87070 CULTURE OTHR SPECIMN AEROBIC: CPT

## 2020-12-17 PROCEDURE — 87880 STREP A ASSAY W/OPTIC: CPT

## 2020-12-17 PROCEDURE — 99283 EMERGENCY DEPT VISIT LOW MDM: CPT

## 2020-12-17 NOTE — PHYS DOC
Past History


Past Medical History:  Other


Additional Past Medical Histor:  HEART MURMOR


Past Surgical History:  , Tubal ligation, Other


Additional Past Surgical Histo:  UMBRELLA IN HEART


Smoking:  Non-smoker


Alcohol Use:  None


Drug Use:  None





General Adult


HPI:


HPI:


".. I got a runny noes.. and sore throat .. for three days... now..". " It is 

worse the last two days.,.. I worried I have strept..."





Patient is a 30 year old female  dependent who presents with above hx 

and complaints congestion, drainage, pharyngitis, malaise, and sneezing.,.  Pt. 

follows with Dr. Jin as a primary.  Patient up-to-date with vaccinations.  

Except did not get a flu vaccination this season.  The patient has had no recent

travel.  Has been self isolating.  No known Covid risk factors.  Child has 

recently developed upper respiratory congestion and drainage.   did have 

a deployment to [place] for 5 months.    has had his self-isolation 

and negative Covid testing.   is without symptoms.  Patient does have a 

significant medical history of a heart murmur which was repaired by a 

interarterial umbrella maneuver to patch the leak in her atrium.  No history of 

immunosuppression.





Review of Systems:


Review of Systems:


Constitutional:  Denies fever or chills 


Eyes:  Denies change in visual acuity 


HENT: History of nasal congestion and sore throat 


Respiratory:  Denies cough or shortness of breath 


Cardiovascular:  Denies chest pain or edema 


GI:  Denies abdominal pain, nausea, vomiting, bloody stools or diarrhea 


: Denies dysuria 


Musculoskeletal:  Denies back pain or joint pain 


Integument:  Denies rash 


Neurologic:  Denies headache, focal weakness or sensory changes 


Endocrine:  Denies polyuria or polydipsia 


Lymphatic:  Denies swollen glands 


Psychiatric:  Denies depression or anxiety





Family History:


Family History:


Son has nasal congestion x2 days





Current Medications:


Current Meds:


See nursing for home meds





Allergies:


Allergies:





Allergies








Coded Allergies Type Severity Reaction Last Updated Verified


 


  Penicillins Allergy Intermediate  20 Yes


 


  amoxicillin Allergy Intermediate  20 Yes











Physical Exam:


PE:





Constitutional: Well developed, well nourished, no acute distress, non-toxic 

appearance. []


HENT: Normocephalic, atraumatic, bilateral external ears normal, oropharynx 

moist, very mild injection of pharynx, no oral exudates, nose swollen turbinates

 and clear rhinorrhea


Eyes: PERRLA, EOMI, conjunctiva normal, no discharge. [] 


Neck: Normal range of motion, no tenderness, supple, no stridor. [] 


Cardiovascular:Heart rate regular rhythm, no murmur []


Lungs & Thorax:  Bilateral breath sounds equal apex on auscultation []


Abdomen: Bowel sounds slightly hyperactive,, soft, no tenderness, no masses, no 

pulsatile masses.  No rebound pain.  Old  scar.


Skin: Warm, dry, no erythema, no rash. [] 


Back: No tenderness, no CVA tenderness. [] 


Extremities: No tenderness, no cyanosis, no clubbing, ROM intact, no edema.  No 

cording


Neurologic: Alert and oriented X 3, normal motor function, normal sensory 

function, no focal deficits noted. []


Psychologic: Affect anxious, judgement normal, mood normal. []





EKG:


EKG:


[]





Radiology/Procedures:


Radiology/Procedures:


[]





Heart Score:


Risk Factors:


Risk Factors:  DM, Current or recent (<one month) smoker, HTN, HLP, family 

history of CAD, obesity.


Risk Scores:


Score 0 - 3:  2.5% MACE over next 6 weeks - Discharge Home


Score 4 - 6:  20.3% MACE over next 6 weeks - Admit for Clinical Observation


Score 7 - 10:  72.7% MACE over next 6 weeks - Early Invasive Strategies





Course & Med Decision Making:


Course & Med Decision Making


Pertinent Labs and Imaging studies reviewed. (See chart for details)





Patient's flu and strep test were negative.  Patient recommended to get 

influenza update went over this acute illness.  Patient continues self 

isolating.  Patient to wear a mask anytime she is away from home that covers her

 nose and mouth.  Patient to follow-up with primary care.  Patient take Tylenol 

and ibuprofen as needed for discomfort.  May take Benadryl 50 mg up to 4 times a

 day for congestion drainage and cough.  Follow-up Dr. Jin.  Return if any 

concerns.  Could consider rapid Covid testing at [place].  





Impression:





1. Viral Illness





[]





Dragon Disclaimer:


Dragon Disclaimer:


This electronic medical record was generated, in whole or in part, using a voice

 recognition dictation system.





Departure


Departure:


Referrals:  


LYNNE JIN MD (PCP)


Scripts


Diphenhydramine Hcl (BENADRYL ALLERGY) 25 Mg Tablet


50 MG PO TID PRN PRN for congest, drainage, #120 TAB


   Prov: RONAL ORTEGA MD         20 


Ibuprofen (Ibuprofen) 100 Mg/5 Ml Oral.susp


400 MG PO TIDPCHC for fever, pain, #120 LIQUID


   Prov: RONAL ORTEGA MD         20 


Acetaminophen (ACETAMINOPHEN) 500 Mg Tablet


500 MG PO QIDPRN PRN for pain, fever, #120 TAB


   Prov: RONAL ORTEGA MD         20





Dragon Disclaimer


This chart was dictated in whole or in part using Voice Recognition software in 

a busy, high-work load, and often noisy Emergency Department environment.  It 

may contain unintended and wholly unrecognized errors or omissions.





Dragon Disclaimer


This chart was dictated in whole or in part using Voice Recognition software in 

a busy, high-work load, and often noisy Emergency Department environment.  It 

may contain unintended and wholly unrecognized errors or omissions.





Dragon Disclaimer


This chart was dictated in whole or in part using Voice Recognition software in 

a busy, high-work load, and often noisy Emergency Department environment.  It 

may contain unintended and wholly unrecognized errors or omissions.











RONAL ORTEGA MD           Dec 17, 2020 23:24

## 2020-12-18 VITALS — SYSTOLIC BLOOD PRESSURE: 125 MMHG | DIASTOLIC BLOOD PRESSURE: 74 MMHG

## 2021-03-17 ENCOUNTER — HOSPITAL ENCOUNTER (EMERGENCY)
Dept: HOSPITAL 63 - ER | Age: 31
LOS: 1 days | Discharge: HOME | End: 2021-03-18
Payer: OTHER GOVERNMENT

## 2021-03-17 VITALS — HEIGHT: 64 IN | BODY MASS INDEX: 18.52 KG/M2 | WEIGHT: 108.47 LBS

## 2021-03-17 DIAGNOSIS — Z98.890: ICD-10-CM
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
## 2021-03-17 DIAGNOSIS — Z88.5: ICD-10-CM

## 2021-03-17 DIAGNOSIS — R55: ICD-10-CM

## 2021-03-17 DIAGNOSIS — S01.81XA: Primary | ICD-10-CM

## 2021-03-17 DIAGNOSIS — Z98.51: ICD-10-CM
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
## 2021-03-17 DIAGNOSIS — N39.0: ICD-10-CM

## 2021-03-17 PROCEDURE — 87086 URINE CULTURE/COLONY COUNT: CPT

## 2021-03-17 PROCEDURE — 12013 RPR F/E/E/N/L/M 2.6-5.0 CM: CPT

## 2021-03-17 PROCEDURE — 81001 URINALYSIS AUTO W/SCOPE: CPT

## 2021-03-17 PROCEDURE — 80048 BASIC METABOLIC PNL TOTAL CA: CPT

## 2021-03-17 PROCEDURE — 93005 ELECTROCARDIOGRAM TRACING: CPT

## 2021-03-17 PROCEDURE — 85025 COMPLETE CBC W/AUTO DIFF WBC: CPT

## 2021-03-17 PROCEDURE — 36415 COLL VENOUS BLD VENIPUNCTURE: CPT

## 2021-03-17 PROCEDURE — 99284 EMERGENCY DEPT VISIT MOD MDM: CPT

## 2021-03-18 VITALS — SYSTOLIC BLOOD PRESSURE: 115 MMHG | DIASTOLIC BLOOD PRESSURE: 77 MMHG

## 2021-03-18 LAB
ANION GAP SERPL CALC-SCNC: 10 MMOL/L (ref 6–14)
APTT PPP: YELLOW S
BACTERIA #/AREA URNS HPF: (no result) /HPF
BASOPHILS # BLD AUTO: 0 X10^3/UL (ref 0–0.2)
BASOPHILS NFR BLD: 1 % (ref 0–3)
BILIRUB UR QL STRIP: (no result)
CA-I SERPL ISE-MCNC: 16 MG/DL (ref 7–20)
CALCIUM SERPL-MCNC: 8.6 MG/DL (ref 8.5–10.1)
CHLORIDE SERPL-SCNC: 107 MMOL/L (ref 98–107)
CO2 SERPL-SCNC: 26 MMOL/L (ref 21–32)
CREAT SERPL-MCNC: 0.9 MG/DL (ref 0.6–1)
EOSINOPHIL NFR BLD: 0.2 X10^3/UL (ref 0–0.7)
EOSINOPHIL NFR BLD: 3 % (ref 0–3)
ERYTHROCYTE [DISTWIDTH] IN BLOOD BY AUTOMATED COUNT: 12.4 % (ref 11.5–14.5)
FIBRINOGEN PPP-MCNC: (no result) MG/DL
GFR SERPLBLD BASED ON 1.73 SQ M-ARVRAT: 72.6 ML/MIN
GLUCOSE SERPL-MCNC: 102 MG/DL (ref 70–99)
GLUCOSE UR STRIP-MCNC: (no result) MG/DL
HCT VFR BLD CALC: 38.4 % (ref 36–47)
HGB BLD-MCNC: 12.9 G/DL (ref 12–15.5)
LYMPHOCYTES # BLD: 2.5 X10^3/UL (ref 1–4.8)
LYMPHOCYTES NFR BLD AUTO: 44 % (ref 24–48)
MCH RBC QN AUTO: 30 PG (ref 25–35)
MCHC RBC AUTO-ENTMCNC: 34 G/DL (ref 31–37)
MCV RBC AUTO: 90 FL (ref 79–100)
MONO #: 0.5 X10^3/UL (ref 0–1.1)
MONOCYTES NFR BLD: 9 % (ref 0–9)
NEUT #: 2.4 X10^3UL (ref 1.8–7.7)
NEUTROPHILS NFR BLD AUTO: 43 % (ref 31–73)
NITRITE UR QL STRIP: (no result)
PLATELET # BLD AUTO: 190 X10^3/UL (ref 140–400)
POTASSIUM SERPL-SCNC: 3.4 MMOL/L (ref 3.5–5.1)
RBC # BLD AUTO: 4.26 X10^6/UL (ref 3.5–5.4)
RBC #/AREA URNS HPF: (no result) /HPF (ref 0–2)
SODIUM SERPL-SCNC: 143 MMOL/L (ref 136–145)
SP GR UR STRIP: 1.02
SQUAMOUS #/AREA URNS LPF: (no result) /LPF
UROBILINOGEN UR-MCNC: 1 MG/DL
WBC # BLD AUTO: 5.6 X10^3/UL (ref 4–11)
WBC #/AREA URNS HPF: >40 /HPF (ref 0–4)

## 2021-03-18 NOTE — PHYS DOC
Past History


Past Medical History:  Other


Additional Past Medical Histor:  HEART MURMOR


Past Surgical History:  , Tubal ligation, Other


Additional Past Surgical Histo:  UMBRELLA IN HEART


Smoking:  Non-smoker


Alcohol Use:  None


Drug Use:  None





Adult General


Chief Complaint


Chief Complaint:  SYNCOPE





HPI


HPI


Patient is an otherwise healthy 30-year-old female who presents with a chief 

complaint of syncope and concern for UTI.  States she has problems with UTI and 

has had several in the past.  States she had one recently but her and her 

 cannot remember if they actually filled the prescription and took them 

to completion.  States she has had some dysuria at home recently with feelings 

of achiness in her flanks bilaterally.  States she has had some mild nausea as 

well but no vomiting.  States that she does CrossFit pretty much daily and is in

otherwise good shape.  States that currently she is also on her menstrual cycle 

which is usually moderate to heavy.  States that today at home she had not eaten

yet, stood up, felt dizzy and passed out on the floor.  States she did get up on

her own accord.  States that she did hit her head on the living room floor and 

sustained a small laceration.  States she is up-to-date on her tetanus 

vaccinations.  Denies any recent traumas, travels, fevers, Covid/flu symptoms, 

chest pain, shortness of breath, abdominal pain,  vomiting, hematuria.





Review of Systems


Review of Systems


Review of systems otherwise unremarkable except noted in HPI





Current Medications


Current Medications





Current Medications








 Medications


  (Trade)  Dose


 Ordered  Sig/Patrick  Start Time


 Stop Time Status Last Admin


Dose Admin


 


 Lidocaine/


 Epinephrine


  (Let


  (Lido-Epineph-Tetra)


 Gel)  3 ml  1X  ONCE  3/18/21 00:45


 3/18/21 01:46 DC 3/18/21 00:45


3 ML











Allergies


Allergies





Allergies








Coded Allergies Type Severity Reaction Last Updated Verified


 


  hydrocodone Allergy Unknown  20 Yes











Physical Exam


Physical Exam





Constitutional: Well developed, well nourished, no acute distress, non-toxic 

appearance. []


HENT: Normocephalic, atraumatic, bilateral external ears normal, oropharynx 

moist, no oral exudates, nose normal. []


Eyes: PERRLA, EOMI, conjunctiva normal, no discharge. [] 


Neck: Normal range of motion, no tenderness, supple, no stridor. [] 


Cardiovascular:Heart rate regular rhythm, no murmur []


Lungs & Thorax:  Bilateral breath sounds clear to auscultation []


Abdomen: Bowel sounds normal, soft, no tenderness, no masses, no pulsatile 

masses. [] 


Skin: Warm, dry, no erythema, no rash. [] 


Back: no CVA tenderness. [] 


Extremities: No tenderness, no cyanosis, no clubbing, ROM intact, no edema. [] 


Neurologic: Alert and oriented X 3, normal motor function, normal sensory 

function, no focal deficits noted. []


Psychologic: Affect normal, judgement normal, mood normal. []





Current Patient Data


Vital Signs





                                   Vital Signs








  Date Time  Temp Pulse Resp B/P (MAP) Pulse Ox O2 Delivery O2 Flow Rate FiO2


 


3/18/21 00:01 98.0 65 16 115/77 (90) 96 Room Air  








Lab Results





                                Laboratory Tests








Test


 3/17/21


23:45 3/18/21


00:47


 


White Blood Count


 5.6 x10^3/uL


(4.0-11.0) 





 


Red Blood Count


 4.26 x10^6/uL


(3.50-5.40) 





 


Hemoglobin


 12.9 g/dL


(12.0-15.5) 





 


Hematocrit


 38.4 %


(36.0-47.0) 





 


Mean Corpuscular Volume


 90 fL ()


 





 


Mean Corpuscular Hemoglobin 30 pg (25-35)   


 


Mean Corpuscular Hemoglobin


Concent 34 g/dL


(31-37) 





 


Red Cell Distribution Width


 12.4 %


(11.5-14.5) 





 


Platelet Count


 190 x10^3/uL


(140-400) 





 


Neutrophils (%) (Auto) 43 % (31-73)   


 


Lymphocytes (%) (Auto) 44 % (24-48)   


 


Monocytes (%) (Auto) 9 % (0-9)   


 


Eosinophils (%) (Auto) 3 % (0-3)   


 


Basophils (%) (Auto) 1 % (0-3)   


 


Neutrophils # (Auto)


 2.4 x10^3uL


(1.8-7.7) 





 


Lymphocytes # (Auto)


 2.5 x10^3/uL


(1.0-4.8) 





 


Monocytes # (Auto)


 0.5 x10^3/uL


(0.0-1.1) 





 


Eosinophils # (Auto)


 0.2 x10^3/uL


(0.0-0.7) 





 


Basophils # (Auto)


 0.0 x10^3/uL


(0.0-0.2) 





 


Sodium Level


 143 mmol/L


(136-145) 





 


Potassium Level


 3.4 mmol/L


(3.5-5.1)  L 





 


Chloride Level


 107 mmol/L


() 





 


Carbon Dioxide Level


 26 mmol/L


(21-32) 





 


Anion Gap 10 (6-14)   


 


Blood Urea Nitrogen


 16 mg/dL


(7-20) 





 


Creatinine


 0.9 mg/dL


(0.6-1.0) 





 


Estimated GFR


(Cockcroft-Gault) 72.6  


 





 


Glucose Level


 102 mg/dL


(70-99)  H 





 


Calcium Level


 8.6 mg/dL


(8.5-10.1) 





 


Urine Collection Type  Unknown  


 


Urine Color  Yellow  


 


Urine Clarity  Hazy  


 


Urine pH  6.0  


 


Urine Specific Gravity  1.020  


 


Urine Protein


 


 Neg


(NEG-TRACE)


 


Urine Glucose (UA)


 


 Neg mg/dL


(NEG)


 


Urine Ketones (Stick)


 


 Neg mg/dL


(NEG)


 


Urine Blood  Mod (NEG)  


 


Urine Nitrite  Pos (NEG)  


 


Urine Bilirubin  Neg (NEG)  


 


Urine Urobilinogen Dipstick


 


 1.0 mg/dL (0.2


mg/dL)


 


Urine Leukocyte Esterase  Small (NEG)  


 


Urine RBC


 


 6-10 /HPF


(0-2)


 


Urine WBC


 


 >40 /HPF (0-4)





 


Urine Squamous Epithelial


Cells 


 Few /LPF  





 


Urine Bacteria


 


 Many /HPF


(0-FEW)











EKG


EKG


Rate of 65, QRS of 90, QTc 398, no STEMI.  Right bundle branch morphology.  []





Radiology/Procedures


Radiology/Procedures


[]





Heart Score


C/O Chest Pain:  No


Risk Factors:


Risk Factors:  DM, Current or recent (<one month) smoker, HTN, HLP, family 

history of CAD, obesity.


Risk Scores:


Risk Factors:  DM, Current or recent (<one month) smoker, HTN, HLP, family 

history of CAD, obesity.





Course & Med Decision Making


Course & Med Decision Making


Patient is a 30-year-old female who presents with concern for UTI and syncope 

with laceration to the forehead


Vital signs not concerning.  Physical exam noted above.  EKG noted above with no

 STEMI but does have right bundle branch block.


Laboratory analysis notable for mild hypokalemia.  Urinalysis notable for 

nitrite positive urinary tract infection, with some hematuria but patient is on 

her menstrual cycle currently.


Started on Cipro in the emergency department.  Fed patient and given something 

to drink.  Gave work note for tomorrow and advised staying home, resting, taking

 her antibiotics, eating and drinking normally and no CrossFit tomorrow.


Also advised to call her primary care physician first thing in the morning to 

update and set up a follow-up visit as soon as possible towards the end of next 

week for repeat urinalysis. 


Patient has a approximately 3 cm very superficial laceration across the forehead

 that does not need suture repair.  Cleaned, put topical LAT for anesthesia, 

explored and Dermabond repair. 


Gave strict return precautions to the ED.  Family grateful, verbalized 

understanding and agreed with plan of discharge.





[]





Dragon Disclaimer


Dragon Disclaimer


This electronic medical record was generated, in whole or in part, using a voice

 recognition dictation system.





Departure


Departure:


Impression:  


   Primary Impression:  


   Urinary tract infection


   Additional Impression:  


   Forehead laceration


Disposition:  01 DC HOME SELF CARE/HOMELESS


Condition:  GOOD


Referrals:  


LYNNE JIN MD (PCP)


Patient Instructions:  Tissue Adhesive Wound Care, Urinary Tract Infection





Additional Instructions:  


Please read all of the attached information.  You can use Tylenol, ibuprofen, 

Benadryl and ice at home as needed for pain control as discussed.  Please take 

all your antibiotics as prescribed until they are gone.  You are given a work 

note for tomorrow, to allow you to stay home, no exercise, eat and drink 

appropriately and call your primary care physician and set up a follow-up 

appointment for sometime later next week for wound check and repeat urinalysis. 

 Please come back to the emergency department with new or concerning symptoms as

 discussed.


Scripts


Ciprofloxacin Hcl (CIPRO) 500 Mg Tablet


1 TAB PO BID for UTI for 7 Days, #13 TAB 0 Refills


   Prov: DARREN KIM MD         3/18/21





Problem Qualifiers











DARREN KIM MD               Mar 18, 2021 02:15

## 2021-03-18 NOTE — EKG
[facility] [address], [place] [address]

Test Date:    2021               Test Time:    23:41:34

Pat Name:     MAREN ZHAO        Department:   

Patient ID:   [id number]           Room:          

Gender:       F                        Technician:   

:          [id number]               Requested By: DARREN KIM

Order Number: [id number]            Reading MD:     

                                 Measurements

Intervals                              Axis          

Rate:         65                       P:            66

OK:           178                      QRS:          40

QRSD:         90                       T:            29

QT:           378                                    

QTc:          398                                    

                           Interpretive Statements

SINUS RHYTHM

INCOMPLETE RIGHT BUNDLE BRANCH BLOCK

T ABNORMALITY IN ANTEROSEPTAL LEADS

ABNORMAL ECG

RI6.02

No previous ECG available for comparison

## 2021-04-09 ENCOUNTER — HOSPITAL ENCOUNTER (EMERGENCY)
Dept: HOSPITAL 63 - ER | Age: 31
Discharge: HOME | End: 2021-04-09
Payer: OTHER GOVERNMENT

## 2021-04-09 VITALS
DIASTOLIC BLOOD PRESSURE: 74 MMHG | WEIGHT: 112.88 LBS | HEIGHT: 64 IN | SYSTOLIC BLOOD PRESSURE: 111 MMHG | BODY MASS INDEX: 19.27 KG/M2

## 2021-04-09 DIAGNOSIS — Z88.5: ICD-10-CM

## 2021-04-09 DIAGNOSIS — E86.0: ICD-10-CM

## 2021-04-09 DIAGNOSIS — J02.8: Primary | ICD-10-CM

## 2021-04-09 DIAGNOSIS — B97.89: ICD-10-CM

## 2021-04-09 PROCEDURE — 87880 STREP A ASSAY W/OPTIC: CPT

## 2021-04-09 PROCEDURE — 99283 EMERGENCY DEPT VISIT LOW MDM: CPT

## 2021-04-09 PROCEDURE — 87070 CULTURE OTHR SPECIMN AEROBIC: CPT

## 2021-04-09 NOTE — PHYS DOC
Past History


Past Medical History:  Other


Additional Past Medical Histor:  HEART MURMOR


Past Surgical History:  , Tubal ligation, Other


Additional Past Surgical Histo:  UMBRELLA IN HEART


Smoking:  Non-smoker


Alcohol Use:  None


Drug Use:  None





General Adult


EDM:


Chief Complaint:  SORE THROAT





HPI:


HPI:


"..I ve had a sore throat the past 2 days.... I sinus check for strep throat is 

very red and he felt like it was strep but his strep test was negative.  His 

test was also negative for Covid so it was mine.."





Patient is a 30 year old female who presents with above hx and complaints of 

sore throat and hoarseness the past 2 days.  No recent travel.  No one else in 

the house is ill except her son.  Patient has history of heart murmur, C-

section, tubal ligation, umbilical repair atrial septal defect age 14, and UTIs.

 Patient last seen in ED on 3/1721 for syncope and UTI.   Patient currently 

follows with Dr. Jin.  No change in meds.  No history immunosuppression.





Review of Systems:


Review of Systems:


Constitutional:  Denies fever or chills 


Eyes:  Denies change in visual acuity 


HENT: Complains of sore throat 


Respiratory:  Denies cough or shortness of breath 


Cardiovascular:  Denies chest pain or edema 


GI:  Denies abdominal pain, nausea, vomiting, bloody stools or diarrhea 


: Denies dysuria 


Musculoskeletal:  Denies back pain or joint pain 


Integument:  Denies rash 


Neurologic:  Denies headache, focal weakness or sensory changes 


Endocrine:  Denies polyuria or polydipsia 


Lymphatic:  Denies swollen glands 


Psychiatric:  Denies depression or anxiety





Family History:


Family History:


Son has suspect strep throat-but a negative strep test and negative Covid test





Current Medications:


Current Meds:


See nursing for home meds





Allergies:


Allergies:





Allergies








Coded Allergies Type Severity Reaction Last Updated Verified


 


  hydrocodone Allergy Unknown  20 Yes











Physical Exam:


PE:





Constitutional: Moderate acute distress, non-toxic appearance. []


HENT: Normocephalic, atraumatic, bilateral external ears normal, oropharynx 

moist, mild injection pharynx, no oral exudates, nose normal. []Scar.


Eyes: PERRLA, EOMI, conjunctiva normal, no discharge. [] 


Neck: Normal range of motion, no tenderness, supple, no stridor.  No adenopathy


Cardiovascular:Heart rate regular rhythm, no murmur []


Lungs & Thorax:  Bilateral breath sounds clear to auscultation []


Abdomen: Bowel sounds normal, soft, no tenderness, no masses, no pulsatile mas

ses. [] 


Skin: Warm, dry, no erythema, no rash. [] 


Back: No tenderness, no CVA tenderness. [] 


Extremities: No tenderness, no cyanosis, no clubbing, ROM intact, no edema. [] 

No cording appreciated


Neurologic: Alert and oriented X 3, normal motor function, normal sensory 

function, no focal deficits noted. []


Psychologic: Affect anxious, judgement normal, mood normal. []





EKG:


EKG:


My interpretation EKG shows a sinus tachycardia 105 bpm.  No acute morphology []





Radiology/Procedures:


Radiology/Procedures:


[]





Heart Score:


C/O Chest Pain:  N/A


HEART Score for Chest Pain:  








HEART Score for Chest Pain Response (Comments) Value


 


History Slighlty/Non-Suspicious 0


 


ECG Normal 0


 


Age < 45 0


 


Risk Factors                            1 or 2 Risk Factors 1


 


Total  1








Risk Factors:


Risk Factors:  DM, Current or recent (<one month) smoker, HTN, HLP, family 

history of CAD, obesity.


Risk Scores:


Score 0 - 3:  2.5% MACE over next 6 weeks - Discharge Home


Score 4 - 6:  20.3% MACE over next 6 weeks - Admit for Clinical Observation


Score 7 - 10:  72.7% MACE over next 6 weeks - Early Invasive Strategies





Course & Med Decision Making:


Course & Med Decision Making


Pertinent Labs and Imaging studies reviewed. (See chart for details)





Patient take Tylenol and ibuprofen for discomfort and fever.   Patient may try 

liquid Benadryl 25 to 50 mg 4 times a day for throat discomfort and drainage.  

Gargle with Listerine 4 x day.  Push fluids.  Follow-up primary care.  Return if

 any concerns.





Impression:





1.  Viral pharyngitis


2.  Mild dehydration





[]





Dragon Disclaimer:


Dragon Disclaimer:


This electronic medical record was generated, in whole or in part, using a voice

 recognition dictation system.





Departure


Departure:


Referrals:  


LYNNE JIN MD (PCP)





Dragon Disclaimer


This chart was dictated in whole or in part using Voice Recognition software in 

a busy, high-work load, and often noisy Emergency Department environment.  It 

may contain unintended and wholly unrecognized errors or omissions.











RONAL ORTEGA MD            2021 02:17
no

## 2021-07-03 ENCOUNTER — HOSPITAL ENCOUNTER (EMERGENCY)
Dept: HOSPITAL 63 - ER | Age: 31
Discharge: HOME | End: 2021-07-03
Payer: OTHER GOVERNMENT

## 2021-07-03 VITALS — SYSTOLIC BLOOD PRESSURE: 110 MMHG | DIASTOLIC BLOOD PRESSURE: 60 MMHG

## 2021-07-03 VITALS — HEIGHT: 64 IN | WEIGHT: 110.23 LBS | BODY MASS INDEX: 18.82 KG/M2

## 2021-07-03 DIAGNOSIS — Z98.51: ICD-10-CM

## 2021-07-03 DIAGNOSIS — N39.0: Primary | ICD-10-CM

## 2021-07-03 LAB
AMPHETAMINE/METHAMPHETAMINE: (no result)
APTT PPP: YELLOW S
BACTERIA #/AREA URNS HPF: (no result) /HPF
BARBITURATES UR-MCNC: (no result) UG/ML
BENZODIAZ UR-MCNC: (no result) UG/L
BILIRUB UR QL STRIP: (no result)
CANNABINOIDS UR-MCNC: (no result) UG/L
COCAINE UR-MCNC: (no result) NG/ML
FIBRINOGEN PPP-MCNC: (no result) MG/DL
GLUCOSE UR STRIP-MCNC: (no result) MG/DL
METHADONE SERPL-MCNC: (no result) NG/ML
NITRITE UR QL STRIP: (no result)
OPIATES UR-MCNC: (no result) NG/ML
PCP SERPL-MCNC: (no result) MG/DL
RBC #/AREA URNS HPF: 0 /HPF (ref 0–2)
SP GR UR STRIP: 1.02
SQUAMOUS #/AREA URNS LPF: (no result) /LPF
UROBILINOGEN UR-MCNC: 4 MG/DL
WBC #/AREA URNS HPF: >40 /HPF (ref 0–4)

## 2021-07-03 PROCEDURE — 80307 DRUG TEST PRSMV CHEM ANLYZR: CPT

## 2021-07-03 PROCEDURE — 87086 URINE CULTURE/COLONY COUNT: CPT

## 2021-07-03 PROCEDURE — 81001 URINALYSIS AUTO W/SCOPE: CPT

## 2021-07-03 PROCEDURE — 36415 COLL VENOUS BLD VENIPUNCTURE: CPT

## 2021-07-03 RX ADMIN — IBUPROFEN ONE MG: 600 TABLET ORAL at 06:04

## 2021-07-03 RX ADMIN — SULFAMETHOXAZOLE AND TRIMETHOPRIM ONE TAB: 800; 160 TABLET ORAL at 06:04

## 2021-07-03 RX ADMIN — PHENAZOPYRIDINE ONE MG: 200 TABLET ORAL at 06:04

## 2021-07-03 NOTE — PHYS DOC
Past History


Past Medical History:  UTI, Other


Additional Past Medical Histor:  HEART MURMOR


Past Surgical History:  , Tubal ligation, Other


Additional Past Surgical Histo:  UMBRELLA IN HEART


Smoking:  Non-smoker


Alcohol Use:  None


Drug Use:  None





General Adult


HPI:


HPI:


".. I got another UTI.. I get them a lot..  I don't drink enough....I don't 

empty my bladder like I should..."





Patient is a 31 year old female dependent who presents with above hx and 

complaints dysuria.  Patient has history of multiple urinary tract infections.  

No recent travel.  No significant ill contacts.  No history immunosuppression.  

Patient normally follows at [facility] for care.  No history of kidney stones with 

her family members.  No history of STDs.





Review of Systems:


Review of Systems:


Constitutional:  Denies fever or chills 


Eyes:  Denies change in visual acuity 


HENT:  Denies nasal congestion or sore throat 


Respiratory:  Denies cough or shortness of breath 


Cardiovascular:  Denies chest pain or edema 


GI:  Denies abdominal pain, nausea, vomiting, bloody stools or diarrhea 


: Complains of dysuria 


Musculoskeletal:  Denies back pain or joint pain 


Integument:  Denies rash 


Neurologic:  Denies headache, focal weakness or sensory changes 


Endocrine:  Denies polyuria or polydipsia 


Lymphatic:  Denies swollen glands 


Psychiatric:  Denies depression or anxiety





Family History:


Family History:


Noncontributory





Current Medications:


Current Meds:


See nursing for home meds





Allergies:


Allergies:





Allergies








Coded Allergies Type Severity Reaction Last Updated Verified


 


  hydrocodone Allergy Unknown  20 Yes











Physical Exam:


PE:





Constitutional: mild distress, non-toxic appearance. []


HENT: Normocephalic, atraumatic, bilateral external ears normal, oropharynx 

moist, no oral exudates, nose normal. []


Eyes: PERRLA, EOMI, conjunctiva normal, no discharge. [] 


Neck: Normal range of motion, no tenderness, supple, no stridor. [] 


Cardiovascular:Heart rate regular rhythm, no murmur []


Lungs & Thorax:  Bilateral breath sounds equal at apex auscultation []


Abdomen: Bowel sounds normal, soft, no tenderness, no masses, no pulsatile 

masses.  Mild left flank tenderness.  No rebound sign.   scar.  

Laparotomy scar.s


Skin: Warm, dry, no erythema, no rash. [] 


Back: No tenderness, left CVA tenderness. [] 


Extremities: No tenderness, no cyanosis, no clubbing, ROM intact, no edema.  No 

psoas sign.


Neurologic: Alert and oriented X 3, normal motor function, normal sensory 

function, no focal deficits noted. []


Psychologic: Affect anxious judgement normal, mood normal. []





EKG:


EKG:


[]





Radiology/Procedures:


Radiology/Procedures:


[]





Heart Score:


C/O Chest Pain:  N/A


Risk Factors:


Risk Factors:  DM, Current or recent (<one month) smoker, HTN, HLP, family 

history of CAD, obesity.


Risk Scores:


Score 0 - 3:  2.5% MACE over next 6 weeks - Discharge Home


Score 4 - 6:  20.3% MACE over next 6 weeks - Admit for Clinical Observation


Score 7 - 10:  72.7% MACE over next 6 weeks - Early Invasive Strategies





Course & Med Decision Making:


Course & Med Decision Making


Pertinent Labs and Imaging studies reviewed. (See chart for details)





Patient push vitamin C drinks.  Patient take Tylenol and ibuprofen for 

discomfort.  Follow-up [facility].  Follow-up urine cultures.  Consider follow-up 

with urology.  Take Bactrim DS twice a day for 10 days.  Take Diflucan 100 mg 

daily for 3 days.





Impression:





1. Dysuria


2. UTI





[]





Dragon Disclaimer:


Dragon Disclaimer:


This electronic medical record was generated, in whole or in part, using a voice

 recognition dictation system.





Departure


Departure:


Referrals:  


PCP,UNKNOWN (PCP)


Scripts


Fluconazole (DIFLUCAN) 100 Mg Tablet


100 MG PO DAILY for post antibiotic for 3 Days, #3 TAB


   Prov: RONAL ORTEGA MD         7/3/21 


Sulfamethoxazole/Trimethoprim (BACTRIM DS TABLET) 1 Each Tablet


1 TAB PO BID for uti for 10 Days, #20 TAB 0 Refills


   Prov: RONAL ORTEGA MD         7/3/21





Dragon Disclaimer


This chart was dictated in whole or in part using Voice Recognition software in 

a busy, high-work load, and often noisy Emergency Department environment.  It 

may contain unintended and wholly unrecognized errors or omissions.





Dragon Disclaimer


This chart was dictated in whole or in part using Voice Recognition software in 

a busy, high-work load, and often noisy Emergency Department environment.  It 

may contain unintended and wholly unrecognized errors or omissions.











RONAL ORTEGA MD            Jul 3, 2021 04:39

## 2021-07-21 ENCOUNTER — HOSPITAL ENCOUNTER (EMERGENCY)
Dept: HOSPITAL 63 - ER | Age: 31
LOS: 1 days | Discharge: HOME | End: 2021-07-22
Payer: OTHER GOVERNMENT

## 2021-07-21 VITALS — BODY MASS INDEX: 19.04 KG/M2 | WEIGHT: 111.55 LBS | HEIGHT: 64 IN

## 2021-07-21 DIAGNOSIS — N39.0: Primary | ICD-10-CM

## 2021-07-21 DIAGNOSIS — Z88.5: ICD-10-CM

## 2021-07-21 DIAGNOSIS — Z87.440: ICD-10-CM

## 2021-07-21 DIAGNOSIS — Z98.51: ICD-10-CM

## 2021-07-21 DIAGNOSIS — Z98.890: ICD-10-CM

## 2021-07-21 LAB
AMPHETAMINE/METHAMPHETAMINE: (no result)
APTT PPP: YELLOW S
BACTERIA #/AREA URNS HPF: (no result) /HPF
BARBITURATES UR-MCNC: (no result) UG/ML
BENZODIAZ UR-MCNC: (no result) UG/L
BILIRUB UR QL STRIP: (no result)
CANNABINOIDS UR-MCNC: (no result) UG/L
COCAINE UR-MCNC: (no result) NG/ML
FIBRINOGEN PPP-MCNC: CLEAR MG/DL
GLUCOSE UR STRIP-MCNC: (no result) MG/DL
METHADONE SERPL-MCNC: (no result) NG/ML
NITRITE UR QL STRIP: (no result)
OPIATES UR-MCNC: (no result) NG/ML
PCP SERPL-MCNC: (no result) MG/DL
RBC #/AREA URNS HPF: (no result) /HPF (ref 0–2)
SP GR UR STRIP: >=1.03
SQUAMOUS #/AREA URNS LPF: (no result) /LPF
UROBILINOGEN UR-MCNC: 1 MG/DL
WBC #/AREA URNS HPF: >40 /HPF (ref 0–4)

## 2021-07-21 PROCEDURE — 36415 COLL VENOUS BLD VENIPUNCTURE: CPT

## 2021-07-21 PROCEDURE — 81001 URINALYSIS AUTO W/SCOPE: CPT

## 2021-07-21 PROCEDURE — 81025 URINE PREGNANCY TEST: CPT

## 2021-07-21 PROCEDURE — 80307 DRUG TEST PRSMV CHEM ANLYZR: CPT

## 2021-07-21 PROCEDURE — 96372 THER/PROPH/DIAG INJ SC/IM: CPT

## 2021-07-21 PROCEDURE — 99283 EMERGENCY DEPT VISIT LOW MDM: CPT

## 2021-07-21 PROCEDURE — 87086 URINE CULTURE/COLONY COUNT: CPT

## 2021-07-21 NOTE — PHYS DOC
Past History


Past Medical History:  UTI, Other


Additional Past Medical Histor:  HEART MURMUR


Past Surgical History:  , Tubal ligation, Other


Additional Past Surgical Histo:  UMBRELLA IN HEART


Smoking:  Non-smoker


Alcohol Use:  None


Drug Use:  None





General Adult


EDM:


Chief Complaint:  BACK PAIN OR INJURY





HPI:


HPI:


".I got some flank pain.. and probably another UTI again... "





Patient is a 31 year old female  dependent who presents with above hx 

and complaints of dysuria.  Pt. has history of frequent urinary tract 

infections.  Patient seen previously on 7/3/2021 for similar complaints.  

Patient patient has previous medical history of heart murmur, , tubal 

ligation, umbrella repair of atrial septal defect, and frequent UTI Brock tract 

infections.  No recent travel.  No specific ill contacts.  No history of STDs.  

No history immunosuppression.  Normally follows at [facility].





Review of Systems:


Review of Systems:


Constitutional:  Denies fever or chills 


Eyes:  Denies change in visual acuity 


HENT:  Denies nasal congestion or sore throat 


Respiratory:  Denies cough or shortness of breath 


Cardiovascular:  Denies chest pain or edema 


GI:  Denies abdominal pain, nausea, vomiting, bloody stools or diarrhea 


: Complains of dysuria 


Musculoskeletal:  Denies back pain or joint pain 


Integument:  Denies rash 


Neurologic:  Denies headache, focal weakness or sensory changes 


Endocrine:  Denies polyuria or polydipsia 


Lymphatic:  Denies swollen glands 


Psychiatric:  Denies depression or anxiety





Family History:


Family History:


Noncontributory to presentation





Current Medications:


Current Meds:


See nursing for home meds





Allergies:


Allergies:





Allergies








Coded Allergies Type Severity Reaction Last Updated Verified


 


  hydrocodone Allergy Intermediate  21 Yes











Physical Exam:


PE:





Constitutional: Moderate acute distress, non-toxic appearance. []


HENT: Normocephalic, atraumatic, bilateral external ears normal, oropharynx 

moist, no oral exudates, nose normal. []


Eyes: PERRLA, EOMI, conjunctiva normal, no discharge. [] 


Neck: Normal range of motion, no tenderness, supple, no stridor. [] 


Cardiovascular:Heart rate regular rhythm, no murmur appreciated


Lungs & Thorax:  Bilateral breath sounds equal apex on auscultation []


Abdomen: Bowel sounds normal, soft, mild bilateral flank tenderness, no masses, 

no pulsatile masses.   C section scars. Patient declines


Pelvic exam at this time.


Skin: Warm, dry, no erythema, no rash. [] 


Back: No tenderness, mild bilateral CVA tenderness. [] 


Extremities: No tenderness, no cyanosis, no clubbing, ROM intact, no edema.  No 

cording.  No psoas sign.


Neurologic: Alert and oriented X 3, normal motor function, normal sensory 

function, no focal deficits noted. []


Psychologic: Affect anxious, judgement normal, mood normal. []





Current Patient Data:


Vital Signs:





                                   Vital Signs








  Date Time  Temp Pulse Resp B/P (MAP) Pulse Ox O2 Delivery O2 Flow Rate FiO2


 


21 23:13 97.9 61 18 117/67 100 Room Air  











EKG:


EKG:


[]





Radiology/Procedures:


Radiology/Procedures:


[]





Heart Score:


C/O Chest Pain:  N/A


Risk Factors:


Risk Factors:  DM, Current or recent (<one month) smoker, HTN, HLP, family 

history of CAD, obesity.


Risk Scores:


Score 0 - 3:  2.5% MACE over next 6 weeks - Discharge Home


Score 4 - 6:  20.3% MACE over next 6 weeks - Admit for Clinical Observation


Score 7 - 10:  72.7% MACE over next 6 weeks - Early Invasive Strategies





Course & Med Decision Making:


Course & Med Decision Making


Pertinent Labs and Imaging studies reviewed. (See chart for details)





Patient push vitamin C drinks.  Patient take Bactrim DS twice a day.  Follow-up 

urine cultures.  Return if any concerns.  Consider full urology work-up with 

contrast retrograde evaluation.





Impression:





1.  Recurrent urinary tract infections





[]





Dragon Disclaimer:


Dragon Disclaimer:


This electronic medical record was generated, in whole or in part, using a voice

 recognition dictation system.





Departure


Departure:


Referrals:  


PCP,UNKNOWN (PCP)


Scripts


Sulfamethoxazole/Trimethoprim (BACTRIM DS TABLET) 1 Each Tablet


1 TAB PO BID for uti for 10 Days, TAB 0 Refills


   Prov: RONAL ORTEGA MD         21





Discharge Summary


Visit Information


Final Diagnosis


Problems


Medical Problems:


(1) UTI (urinary tract infection)


Status: Acute  











Brief Hospital Course


Allergies





                                    Allergies








Coded Allergies Type Severity Reaction Last Updated Verified


 


  hydrocodone Allergy Intermediate  21 Yes








Vital Signs





Vital Signs








  Date Time  Temp Pulse Resp B/P (MAP) Pulse Ox O2 Delivery O2 Flow Rate FiO2


 


21 23:13 97.9 61 18 117/67 100 Room Air  








Lab Results





Laboratory Tests








Test


 21


23:17 21


23:33


 


Urine Collection Type Unknown  


 


Urine Color Yellow  


 


Urine Clarity Clear  


 


Urine pH 6.0  


 


Urine Specific Gravity >=1.030  


 


Urine Protein


 Trace


(NEG-TRACE) 





 


Urine Glucose (UA)


 Neg mg/dL


(NEG) 





 


Urine Ketones (Stick)


 Neg mg/dL


(NEG) 





 


Urine Blood Trace (NEG)  


 


Urine Nitrite Pos (NEG)  


 


Urine Bilirubin Neg (NEG)  


 


Urine Urobilinogen Dipstick


 1.0 mg/dL (0.2


mg/dL) 





 


Urine Leukocyte Esterase Trace (NEG)  


 


Urine RBC Occ /HPF (0-2)  


 


Urine WBC >40 /HPF (0-4)  


 


Urine Squamous Epithelial


Cells Occ /LPF 


 





 


Urine Bacteria


 Mod /HPF


(0-FEW) 





 


Urine Opiates Screen Neg (NEG)  


 


Urine Methadone Screen Neg (NEG)  


 


Urine Barbiturates Neg (NEG)  


 


Urine Phencyclidine Screen Neg (NEG)  


 


Urine


Amphetamine/Methamphetamine Neg (NEG) 


 





 


Urine Benzodiazepines Screen Neg (NEG)  


 


Urine Cocaine Screen Neg (NEG)  


 


Urine Cannabinoids Screen Neg (NEG)  


 


Urine Ethyl Alcohol Neg (NEG)  


 


Bedside Urine HCG, Qualitative


 


 hcg negative


(Negative)








Brief Hospital Course


Ms. Ruvalcaba  is a 31 old [sex] who presented with [ ]





Discharge Information


Dischare Medications





Current Medications


Trimethoprim/ Sulfamethoxazole (Bactrim Ds) 1 tab 1X  ONCE PO  Last administered

on 21at 00:06;  Start 21 at 00:00;  Stop 21 at 00:01;  Status DC


Phenazopyridine HCl (Pyridium) 200 mg 1X  ONCE PO  Last administered on 

21at 00:06;  Start 21 at 00:00;  Stop 21 at 00:01;  Status DC


Ceftriaxone Sodium (Rocephin Im) 1 gm 1X  ONCE IM  Last administered on 

21at 00:42;  Start 21 at 00:30;  Stop 21 at 00:31;  Status DC





Active Scripts


Active


Bactrim Ds Tablet (Sulfamethoxazole/Trimethoprim) 1 Each Tablet 1 Tab PO BID 10 

Days


Diflucan (Fluconazole) 100 Mg Tablet 100 Mg PO DAILY 3 Days


Bactrim Ds Tablet (Sulfamethoxazole/Trimethoprim) 1 Each Tablet 1 Tab PO BID 10 

Days


Reported


[takes vitam and supp]





Dragon Disclaimer


This chart was dictated in whole or in part using Voice Recognition software in 

a busy, high-work load, and often noisy Emergency Department environment.  It 

may contain unintended and wholly unrecognized errors or omissions.











RONAL ORTEGA MD           2021 23:31

## 2021-07-22 VITALS — DIASTOLIC BLOOD PRESSURE: 62 MMHG | SYSTOLIC BLOOD PRESSURE: 114 MMHG

## 2021-07-28 ENCOUNTER — HOSPITAL ENCOUNTER (EMERGENCY)
Dept: HOSPITAL 63 - ER | Age: 31
LOS: 1 days | Discharge: HOME | End: 2021-07-29
Payer: OTHER GOVERNMENT

## 2021-07-28 VITALS — DIASTOLIC BLOOD PRESSURE: 52 MMHG | SYSTOLIC BLOOD PRESSURE: 90 MMHG

## 2021-07-28 VITALS — HEIGHT: 64 IN | BODY MASS INDEX: 19.23 KG/M2 | WEIGHT: 112.66 LBS

## 2021-07-28 DIAGNOSIS — Z88.5: ICD-10-CM

## 2021-07-28 DIAGNOSIS — Z98.51: ICD-10-CM

## 2021-07-28 DIAGNOSIS — N39.0: Primary | ICD-10-CM

## 2021-07-28 DIAGNOSIS — Z87.440: ICD-10-CM

## 2021-07-28 DIAGNOSIS — Z98.890: ICD-10-CM

## 2021-07-28 LAB
BASOPHILS # BLD AUTO: 0 X10^3/UL (ref 0–0.2)
BASOPHILS NFR BLD: 1 % (ref 0–3)
EOSINOPHIL NFR BLD: 0.2 X10^3/UL (ref 0–0.7)
EOSINOPHIL NFR BLD: 3 % (ref 0–3)
ERYTHROCYTE [DISTWIDTH] IN BLOOD BY AUTOMATED COUNT: 13.2 % (ref 11.5–14.5)
HCT VFR BLD CALC: 38.9 % (ref 36–47)
HGB BLD-MCNC: 12.9 G/DL (ref 12–15.5)
LYMPHOCYTES # BLD: 3.2 X10^3/UL (ref 1–4.8)
LYMPHOCYTES NFR BLD AUTO: 47 % (ref 24–48)
MCH RBC QN AUTO: 30 PG (ref 25–35)
MCHC RBC AUTO-ENTMCNC: 33 G/DL (ref 31–37)
MCV RBC AUTO: 91 FL (ref 79–100)
MONO #: 0.5 X10^3/UL (ref 0–1.1)
MONOCYTES NFR BLD: 8 % (ref 0–9)
NEUT #: 2.8 X10^3UL (ref 1.8–7.7)
NEUTROPHILS NFR BLD AUTO: 41 % (ref 31–73)
PLATELET # BLD AUTO: 256 X10^3/UL (ref 140–400)
RBC # BLD AUTO: 4.27 X10^6/UL (ref 3.5–5.4)
WBC # BLD AUTO: 6.9 X10^3/UL (ref 4–11)

## 2021-07-28 PROCEDURE — 36415 COLL VENOUS BLD VENIPUNCTURE: CPT

## 2021-07-28 PROCEDURE — 96365 THER/PROPH/DIAG IV INF INIT: CPT

## 2021-07-28 PROCEDURE — 81001 URINALYSIS AUTO W/SCOPE: CPT

## 2021-07-28 PROCEDURE — 81025 URINE PREGNANCY TEST: CPT

## 2021-07-28 PROCEDURE — 99284 EMERGENCY DEPT VISIT MOD MDM: CPT

## 2021-07-28 PROCEDURE — 80048 BASIC METABOLIC PNL TOTAL CA: CPT

## 2021-07-28 PROCEDURE — 96375 TX/PRO/DX INJ NEW DRUG ADDON: CPT

## 2021-07-28 PROCEDURE — 85025 COMPLETE CBC W/AUTO DIFF WBC: CPT

## 2021-07-28 NOTE — PHYS DOC
Past History


Past Medical History:  UTI, Other


Additional Past Medical Histor:  HEART MURMUR


Past Surgical History:  , Tubal ligation, Other


Additional Past Surgical Histo:  UMBRELLA IN HEART


Smoking:  Non-smoker


Alcohol Use:  None


Drug Use:  None





Adult General


Chief Complaint


Chief Complaint:  ABDOMINAL PAIN





HPI


HPI


Patient is a 31-year-old female who presents with a chief complaint of left 

flank pain.  States she was diagnosed with a urinary tract infection a few days 

ago and started on Bactrim but feels like it did not actually help and started 

having this flank pain yesterday, sharp in nature, 7 out of 10 with some 

radiation to her groin.  States she never had anything like this before and has 

has no history of renal stones.  Denies any recent traumas, travels, fevers, 

chest pain, shortness of breath, other abdominal pain, nausea, vomiting, 

diarrhea, dysuria, hematuria or blood in the stool.  States she is having normal

bowel movements.  States her last menstrual cycle ended 2 weeks ago.





Review of Systems


Review of Systems


Review of systems otherwise unremarkable except noted in HPI





Allergies


Allergies





Allergies








Coded Allergies Type Severity Reaction Last Updated Verified


 


  hydrocodone Allergy Intermediate  21 Yes











Physical Exam


Physical Exam





Constitutional: Well developed, well nourished, no acute distress, non-toxic 

appearance. []


HENT: Normocephalic, atraumatic, bilateral external ears normal, oropharynx 

moist, no oral exudates, nose normal. []


Eyes:  conjunctiva normal, no discharge. [] 


Neck: Normal range of motion, no tenderness, supple, no stridor. [] 


Cardiovascular:Heart rate regular rhythm, no murmur []


Lungs & Thorax:  Bilateral breath sounds clear to auscultation []


Abdomen: soft, no tenderness, no masses, no pulsatile masses. [] 


Skin: Warm, dry, no erythema, no rash. [] 


Back: Left CVA tenderness. [] 


Extremities: No tenderness,  ROM intact, no edema. [] 


Neurologic: Alert and oriented X 3, normal motor function, normal sensory 

function, no focal deficits noted. []


Psychologic: Affect normal, judgement normal, mood normal. []





EKG


EKG


[]





Radiology/Procedures


Radiology/Procedures


[]





Heart Score


C/O Chest Pain:  No


Risk Factors:


Risk Factors:  DM, Current or recent (<one month) smoker, HTN, HLP, family 

history of CAD, obesity.


Risk Scores:


Risk Factors:  DM, Current or recent (<one month) smoker, HTN, HLP, family 

history of CAD, obesity.





Course & Med Decision Making


Course & Med Decision Making


Patient is a 31-year-old female who presents with a chief complaint of left 

flank pain for 2 days after being diagnosed with a UTI on Bactrim


Vital signs not concerning.  Physical exam noted above.  Given fentanyl for 

pain.  Laboratory analysis not concerning.  Urine culture and sensitivity noted 

that patient's UTI was E. coli and not susceptible to the Bactrim she has been 

taking.


Given patient's history and this information, patient most likely suffering from

 pyelonephritis now given failure of outpatient treatment.  Given dose of 

Rocephin in the ED and started on Keflex.


Discussed all findings with patient and advised to follow-up in the morning with

 primary care physician to update and set up a follow-up.  Gave return 

precautions to the ED.  Patient grateful, verbalized understanding agree with 

plan of discharge.





[]





Dragon Disclaimer


Dragon Disclaimer


This electronic medical record was generated, in whole or in part, using a voice

recognition dictation system.





Departure


Departure:


Impression:  


   Primary Impression:  


   Urinary tract infection


Disposition:   HOME / SELF CARE / HOMELESS


Condition:  GOOD


Referrals:  


DANIEL JIN


Patient Instructions:  Urinary Tract Infection





Additional Instructions:  


Thank you for coming into the emergency department tonight and allowing us to 

take care of you.  Please read all the attached information very carefully to go

back over what we discussed.  Please discontinue taking your Bactrim and start 

taking the Keflex that was prescribed to you and started tonight.  Please call 

your primary care physician in the morning to update on ED visit and set up a 

follow-up as soon as possible.  Please come back to the ED with new or 

concerning symptoms as discussed.


Scripts


Cephalexin (CEPHALEXIN) 500 Mg Capsule


1 CAP PO TID for UTI for 10 Days, #30 CAP


   Prov: DARREN KIM MD         21











DARREN KIM MD               2021 23:31

## 2021-07-29 LAB
ANION GAP SERPL CALC-SCNC: 7 MMOL/L (ref 6–14)
APTT PPP: YELLOW S
BACTERIA #/AREA URNS HPF: 0 /HPF
BILIRUB UR QL STRIP: (no result)
CA-I SERPL ISE-MCNC: 22 MG/DL (ref 7–20)
CALCIUM SERPL-MCNC: 8.7 MG/DL (ref 8.5–10.1)
CHLORIDE SERPL-SCNC: 105 MMOL/L (ref 98–107)
CO2 SERPL-SCNC: 29 MMOL/L (ref 21–32)
CREAT SERPL-MCNC: 1.1 MG/DL (ref 0.6–1)
FIBRINOGEN PPP-MCNC: CLEAR MG/DL
GFR SERPLBLD BASED ON 1.73 SQ M-ARVRAT: 57.2 ML/MIN
GLUCOSE SERPL-MCNC: 98 MG/DL (ref 70–99)
GLUCOSE UR STRIP-MCNC: (no result) MG/DL
NITRITE UR QL STRIP: (no result)
POTASSIUM SERPL-SCNC: 3.9 MMOL/L (ref 3.5–5.1)
RBC #/AREA URNS HPF: (no result) /HPF (ref 0–2)
SODIUM SERPL-SCNC: 141 MMOL/L (ref 136–145)
SP GR UR STRIP: 1.02
SQUAMOUS #/AREA URNS LPF: (no result) /LPF
UROBILINOGEN UR-MCNC: 4 MG/DL
WBC #/AREA URNS HPF: (no result) /HPF (ref 0–4)

## 2021-08-22 ENCOUNTER — HOSPITAL ENCOUNTER (EMERGENCY)
Dept: HOSPITAL 63 - ER | Age: 31
Discharge: HOME | End: 2021-08-22
Payer: OTHER GOVERNMENT

## 2021-08-22 VITALS — SYSTOLIC BLOOD PRESSURE: 103 MMHG | DIASTOLIC BLOOD PRESSURE: 72 MMHG

## 2021-08-22 VITALS — HEIGHT: 64 IN | WEIGHT: 112.66 LBS | BODY MASS INDEX: 19.23 KG/M2

## 2021-08-22 DIAGNOSIS — Z98.51: ICD-10-CM

## 2021-08-22 DIAGNOSIS — R07.89: Primary | ICD-10-CM

## 2021-08-22 LAB
ALBUMIN SERPL-MCNC: 4 G/DL (ref 3.4–5)
ALBUMIN/GLOB SERPL: 1.2 {RATIO} (ref 1–1.7)
ALP SERPL-CCNC: 71 U/L (ref 46–116)
ALT SERPL-CCNC: 26 U/L (ref 14–59)
ANION GAP SERPL CALC-SCNC: 8 MMOL/L (ref 6–14)
APTT PPP: YELLOW S
AST SERPL-CCNC: 20 U/L (ref 15–37)
BACTERIA #/AREA URNS HPF: (no result) /HPF
BASOPHILS # BLD AUTO: 0.1 X10^3/UL (ref 0–0.2)
BASOPHILS NFR BLD: 1 % (ref 0–3)
BILIRUB SERPL-MCNC: 0.5 MG/DL (ref 0.2–1)
BILIRUB UR QL STRIP: (no result)
BUN/CREAT SERPL: 15 (ref 6–20)
CA-I SERPL ISE-MCNC: 15 MG/DL (ref 7–20)
CALCIUM SERPL-MCNC: 8.8 MG/DL (ref 8.5–10.1)
CHLORIDE SERPL-SCNC: 105 MMOL/L (ref 98–107)
CO2 SERPL-SCNC: 25 MMOL/L (ref 21–32)
CREAT SERPL-MCNC: 1 MG/DL (ref 0.6–1)
EOSINOPHIL NFR BLD: 0.2 X10^3/UL (ref 0–0.7)
EOSINOPHIL NFR BLD: 3 % (ref 0–3)
ERYTHROCYTE [DISTWIDTH] IN BLOOD BY AUTOMATED COUNT: 13.2 % (ref 11.5–14.5)
FIBRINOGEN PPP-MCNC: CLEAR MG/DL
GFR SERPLBLD BASED ON 1.73 SQ M-ARVRAT: 63.9 ML/MIN
GLOBULIN SER-MCNC: 3.4 G/DL (ref 2.2–3.8)
GLUCOSE SERPL-MCNC: 93 MG/DL (ref 70–99)
GLUCOSE UR STRIP-MCNC: (no result) MG/DL
HCT VFR BLD CALC: 37.7 % (ref 36–47)
HGB BLD-MCNC: 12.8 G/DL (ref 12–15.5)
LYMPHOCYTES # BLD: 3 X10^3/UL (ref 1–4.8)
LYMPHOCYTES NFR BLD AUTO: 38 % (ref 24–48)
MCH RBC QN AUTO: 31 PG (ref 25–35)
MCHC RBC AUTO-ENTMCNC: 34 G/DL (ref 31–37)
MCV RBC AUTO: 91 FL (ref 79–100)
MONO #: 0.6 X10^3/UL (ref 0–1.1)
MONOCYTES NFR BLD: 8 % (ref 0–9)
NEUT #: 4 X10^3UL (ref 1.8–7.7)
NEUTROPHILS NFR BLD AUTO: 51 % (ref 31–73)
NITRITE UR QL STRIP: (no result)
PLATELET # BLD AUTO: 242 X10^3/UL (ref 140–400)
POTASSIUM SERPL-SCNC: 3.2 MMOL/L (ref 3.5–5.1)
PROT SERPL-MCNC: 7.4 G/DL (ref 6.4–8.2)
RBC # BLD AUTO: 4.17 X10^6/UL (ref 3.5–5.4)
RBC #/AREA URNS HPF: (no result) /HPF (ref 0–2)
SODIUM SERPL-SCNC: 138 MMOL/L (ref 136–145)
SP GR UR STRIP: 1.02
SQUAMOUS #/AREA URNS LPF: (no result) /LPF
UROBILINOGEN UR-MCNC: 1 MG/DL
WBC # BLD AUTO: 7.8 X10^3/UL (ref 4–11)
WBC #/AREA URNS HPF: (no result) /HPF (ref 0–4)

## 2021-08-22 PROCEDURE — 81025 URINE PREGNANCY TEST: CPT

## 2021-08-22 PROCEDURE — 36415 COLL VENOUS BLD VENIPUNCTURE: CPT

## 2021-08-22 PROCEDURE — 71046 X-RAY EXAM CHEST 2 VIEWS: CPT

## 2021-08-22 PROCEDURE — 85610 PROTHROMBIN TIME: CPT

## 2021-08-22 PROCEDURE — 96374 THER/PROPH/DIAG INJ IV PUSH: CPT

## 2021-08-22 PROCEDURE — 99285 EMERGENCY DEPT VISIT HI MDM: CPT

## 2021-08-22 PROCEDURE — 85025 COMPLETE CBC W/AUTO DIFF WBC: CPT

## 2021-08-22 PROCEDURE — 80053 COMPREHEN METABOLIC PANEL: CPT

## 2021-08-22 PROCEDURE — 84702 CHORIONIC GONADOTROPIN TEST: CPT

## 2021-08-22 PROCEDURE — 93005 ELECTROCARDIOGRAM TRACING: CPT

## 2021-08-22 PROCEDURE — 84484 ASSAY OF TROPONIN QUANT: CPT

## 2021-08-22 PROCEDURE — 87086 URINE CULTURE/COLONY COUNT: CPT

## 2021-08-22 PROCEDURE — 85730 THROMBOPLASTIN TIME PARTIAL: CPT

## 2021-08-22 PROCEDURE — 81001 URINALYSIS AUTO W/SCOPE: CPT

## 2021-08-22 NOTE — RAD
Exam: Chest 2 views



INDICATION: Chest pain



TECHNIQUE: Frontal and lateral views the chest



Comparisons: None



FINDINGS:

The cardiomediastinal silhouette and pulmonary vessels are within normal limits.



The lung and pleural spaces are clear.



IMPRESSION:

No acute cardiopulmonary process.



Electronically signed by: Dawson Rothman MD (8/22/2021 9:11 PM) MEGHANN

## 2021-11-13 ENCOUNTER — HOSPITAL ENCOUNTER (EMERGENCY)
Dept: HOSPITAL 63 - ER | Age: 31
Discharge: HOME | End: 2021-11-13
Payer: OTHER GOVERNMENT

## 2021-11-13 VITALS
DIASTOLIC BLOOD PRESSURE: 63 MMHG | BODY MASS INDEX: 19.23 KG/M2 | SYSTOLIC BLOOD PRESSURE: 118 MMHG | HEIGHT: 64 IN | WEIGHT: 112.66 LBS

## 2021-11-13 DIAGNOSIS — Z98.51: ICD-10-CM

## 2021-11-13 DIAGNOSIS — Z88.5: ICD-10-CM

## 2021-11-13 DIAGNOSIS — Z87.440: ICD-10-CM

## 2021-11-13 DIAGNOSIS — N39.0: Primary | ICD-10-CM

## 2021-11-13 DIAGNOSIS — Z98.890: ICD-10-CM

## 2021-11-13 LAB
APTT PPP: YELLOW S
BACTERIA #/AREA URNS HPF: (no result) /HPF
BILIRUB UR QL STRIP: (no result)
FIBRINOGEN PPP-MCNC: CLEAR MG/DL
GLUCOSE UR STRIP-MCNC: (no result) MG/DL
NITRITE UR QL STRIP: (no result)
RBC #/AREA URNS HPF: (no result) /HPF (ref 0–2)
SP GR UR STRIP: 1.01
SQUAMOUS #/AREA URNS LPF: (no result) /LPF
UROBILINOGEN UR-MCNC: 0.2 MG/DL
WBC #/AREA URNS HPF: (no result) /HPF (ref 0–4)

## 2021-11-13 PROCEDURE — 81001 URINALYSIS AUTO W/SCOPE: CPT

## 2021-11-13 PROCEDURE — 87186 SC STD MICRODIL/AGAR DIL: CPT

## 2021-11-13 PROCEDURE — 99283 EMERGENCY DEPT VISIT LOW MDM: CPT

## 2021-11-13 PROCEDURE — 87086 URINE CULTURE/COLONY COUNT: CPT

## 2021-11-13 PROCEDURE — 87077 CULTURE AEROBIC IDENTIFY: CPT

## 2021-11-13 NOTE — PHYS DOC
Past History


Past Medical History:  UTI, Other


Additional Past Medical Histor:  HEART MURMUR


 (KTAHY AGRAWAL YUKO APRN)


Past Surgical History:  , Tubal ligation, Other


Additional Past Surgical Histo:  UMBRELLA IN HEART


 (KATHY AGRAWAL YUKO APRN)


Smoking:  Non-smoker


Alcohol Use:  None


Drug Use:  None


 (KATHY AGRAWAL YUKO APRN)





Adult General


Chief Complaint


Chief Complaint:  PAIN ON URINATION





[facility]


HPI





Patient is a 31-year-old female patient presenting to the ED today complaining 

of mild intermittent low back pain and neck spasms, symptoms for 2 days. Patient

states whenever she has these symptoms  she has a UTI. Denies any chance she is 

pregnant, she states her fallopian tubes were removed. She denies any fever. 

Denies any hematuria.


 (KATHY AGRAWAL YUKO APRN)





Review of Systems


Review of Systems





Constitutional: Denies fever or chills []


Eyes: Denies change in visual acuity, redness, or eye pain []


HENT: Denies nasal congestion or sore throat []


Respiratory: Denies cough or shortness of breath []


Cardiovascular: No additional information not addressed in HPI []


GI: Denies abdominal pain, nausea, vomiting, bloody stools or diarrhea []


: Reports dysuria


Musculoskeletal: Reports right sided back pain and neck spasms


Integument: Denies rash or skin lesions []


Neurologic: Denies headache, focal weakness or sensory changes []








All other systems were reviewed and found to be within normal limits, except as 

documented in this note.


 (KATHY AGRAWAL YUKO APRN)





Allergies


Allergies





Allergies








Coded Allergies Type Severity Reaction Last Updated Verified


 


  hydrocodone Allergy Intermediate  21 Yes








 (TANJAKATHY YUKO APRN)





Physical Exam


Physical Exam





Constitutional: Well developed, well nourished, no acute distress, non-toxic 

appearance. []


HENT: Normocephalic, atraumatic, bilateral external ears normal, oropharynx 

moist, no oral exudates, nose normal. []


Eyes: PERRLA, EOMI, conjunctiva normal, no discharge. [] 


Neck: Normal range of motion, no tenderness, supple, no stridor. [] 


Cardiovascular:Heart rate regular rhythm, no murmur []


Lungs & Thorax:  Bilateral breath sounds clear to auscultation []


Abdomen: Bowel sounds normal, soft, no tenderness, no masses, no pulsatile 

masses. [] 


Skin: Warm, dry, no erythema, no rash. [] 


Back: No tenderness, no CVA tenderness. [] 


Extremities: No tenderness, no cyanosis, no clubbing, ROM intact, no edema. [] 


Neurologic: Alert and oriented X 3, normal motor function, normal sensory 

function, no focal deficits noted. []


Psychologic: Affect normal, judgement normal, mood normal. []


 (KATHY AGRAWAL)





EKG


EKG


[]


 (KATHY AGRAWAL)





Radiology/Procedures


Radiology/Procedures


[]


 (KATHY AGRAWAL)





Heart Score


C/O Chest Pain:  N/A


Risk Factors:


Risk Factors:  DM, Current or recent (<one month) smoker, HTN, HLP, family 

history of CAD, obesity.


Risk Scores:


Risk Factors:  DM, Current or recent (<one month) smoker, HTN, HLP, family 

history of CAD, obesity.


 (KATHY AGRAWAL)





Course & Med Decision Making


Course & Med Decision Making


Pertinent Labs and Imaging studies reviewed. (See chart for details)





This is a 31-year-old female patient presented to the ED today complaining of 

low back pain and neck spasms, she states this is her typical presentation for 

UTI.





Urine positive for small amount of leukocytes.  Looking at patient's previous 

urine culture she is susceptible to Augmentin and Macrobid.  I offered her 

Macrobid, she refused stating this does not work for her.  Discharged on 

antiemetic.  Follow-up with urologist considering her history of chronic UTIs.  

She states she has a referral to a urologist is still waiting to hear from the 

office








 (KATHY AGRAWAL)


Course & Med Decision Making


I was the Attending physician on the above date of service of this patient. This

 patient was evaluated, examined, treated, and dispositioned from the emergency 

department by the mid-level practitioner.  Although I was working at the time , 

no assistance was requested. 





Electronically signed, Sherri Dickson DO





 (SHERRI DICKSON DO)


[facility] Disclaimer


Dragon Disclaimer


This electronic medical record was generated, in whole or in part, using a voice

 recognition dictation system.


 (KATHY AGRAWAL)





Departure


Departure:


Impression:  


   Primary Impression:  


   UTI (urinary tract infection)


Disposition:   HOME / SELF CARE / HOMELESS


Condition:  STABLE


Referrals:  


PCP,UNKNOWN (PCP)


follow up in one week


Patient Instructions:  Urinary Tract Infection





Additional Instructions:  


You were seen for urinary tract infection.  Take the prescribed antibiotics 

until completed.  Please follow-up with the urologist as soon as he can.  Push 

fluids


Scripts


Diclofenac Potassium (DICLOFENAC POTASSIUM) 50 Mg Tablet


1 TAB PO BID, #20 TAB 1 Refill


   Prov: KATHY AGRAWAL         21 


Cyclobenzaprine Hcl (CYCLOBENZAPRINE HCL) 10 Mg Tablet


1 TAB PO TID, #30 TAB


   Prov: KATHY AGRAWAL         21 


Amoxicillin/Potassium Clav (AUGMENTIN 875-125 TABLET) 1 Each Tablet


1 TAB PO BID for 7 Days, #14 TAB 0 Refills


   Prov: KATHY AGRAWAL         21





Problem Qualifiers








   Primary Impression:  


   UTI (urinary tract infection)


   Urinary tract infection type:  site unspecified  Hematuria presence:  without

    hematuria  Qualified Codes:  N39.0 - Urinary tract infection, site not 

   specified








KATHY AGRAWAL            2021 16:26


SHERRI DICKSON DO                 2021 06:58

## 2021-12-15 ENCOUNTER — HOSPITAL ENCOUNTER (EMERGENCY)
Dept: HOSPITAL 63 - ER | Age: 31
Discharge: HOME | End: 2021-12-15
Payer: OTHER GOVERNMENT

## 2021-12-15 VITALS — DIASTOLIC BLOOD PRESSURE: 65 MMHG | SYSTOLIC BLOOD PRESSURE: 104 MMHG

## 2021-12-15 VITALS — BODY MASS INDEX: 19.46 KG/M2 | WEIGHT: 113.98 LBS | HEIGHT: 64 IN

## 2021-12-15 DIAGNOSIS — Z88.0: ICD-10-CM

## 2021-12-15 DIAGNOSIS — N39.0: Primary | ICD-10-CM

## 2021-12-15 DIAGNOSIS — Z98.890: ICD-10-CM

## 2021-12-15 DIAGNOSIS — Z98.51: ICD-10-CM

## 2021-12-15 DIAGNOSIS — Z88.5: ICD-10-CM

## 2021-12-15 DIAGNOSIS — Z87.440: ICD-10-CM

## 2021-12-15 LAB
AMPHETAMINE/METHAMPHETAMINE: (no result)
APTT PPP: YELLOW S
BACTERIA #/AREA URNS HPF: (no result) /HPF
BARBITURATES UR-MCNC: (no result) UG/ML
BENZODIAZ UR-MCNC: (no result) UG/L
BILIRUB UR QL STRIP: (no result)
CANNABINOIDS UR-MCNC: (no result) UG/L
COCAINE UR-MCNC: (no result) NG/ML
FIBRINOGEN PPP-MCNC: (no result) MG/DL
GLUCOSE UR STRIP-MCNC: (no result) MG/DL
METHADONE SERPL-MCNC: (no result) NG/ML
NITRITE UR QL STRIP: (no result)
OPIATES UR-MCNC: (no result) NG/ML
PCP SERPL-MCNC: (no result) MG/DL
RBC #/AREA URNS HPF: (no result) /HPF (ref 0–2)
SP GR UR STRIP: 1.02
SQUAMOUS #/AREA URNS LPF: (no result) /LPF
UROBILINOGEN UR-MCNC: 2 MG/DL
WBC #/AREA URNS HPF: >40 /HPF (ref 0–4)

## 2021-12-15 PROCEDURE — 81001 URINALYSIS AUTO W/SCOPE: CPT

## 2021-12-15 PROCEDURE — 87491 CHLMYD TRACH DNA AMP PROBE: CPT

## 2021-12-15 PROCEDURE — 96372 THER/PROPH/DIAG INJ SC/IM: CPT

## 2021-12-15 PROCEDURE — 87086 URINE CULTURE/COLONY COUNT: CPT

## 2021-12-15 PROCEDURE — 81025 URINE PREGNANCY TEST: CPT

## 2021-12-15 PROCEDURE — 99283 EMERGENCY DEPT VISIT LOW MDM: CPT

## 2021-12-15 PROCEDURE — 87591 N.GONORRHOEAE DNA AMP PROB: CPT

## 2021-12-15 PROCEDURE — 80307 DRUG TEST PRSMV CHEM ANLYZR: CPT

## 2021-12-15 PROCEDURE — 36415 COLL VENOUS BLD VENIPUNCTURE: CPT

## 2022-01-27 ENCOUNTER — HOSPITAL ENCOUNTER (EMERGENCY)
Dept: HOSPITAL 63 - ER | Age: 32
Discharge: HOME | End: 2022-01-27
Payer: OTHER GOVERNMENT

## 2022-01-27 VITALS — HEIGHT: 64 IN | WEIGHT: 124.56 LBS | BODY MASS INDEX: 21.27 KG/M2

## 2022-01-27 VITALS — DIASTOLIC BLOOD PRESSURE: 64 MMHG | SYSTOLIC BLOOD PRESSURE: 120 MMHG

## 2022-01-27 DIAGNOSIS — Z88.0: ICD-10-CM

## 2022-01-27 DIAGNOSIS — Z98.51: ICD-10-CM

## 2022-01-27 DIAGNOSIS — Z98.890: ICD-10-CM

## 2022-01-27 DIAGNOSIS — R31.9: ICD-10-CM

## 2022-01-27 DIAGNOSIS — Z87.440: ICD-10-CM

## 2022-01-27 DIAGNOSIS — Z88.5: ICD-10-CM

## 2022-01-27 DIAGNOSIS — Z88.1: ICD-10-CM

## 2022-01-27 DIAGNOSIS — N39.0: Primary | ICD-10-CM

## 2022-01-27 PROCEDURE — 81001 URINALYSIS AUTO W/SCOPE: CPT

## 2022-01-27 PROCEDURE — 99284 EMERGENCY DEPT VISIT MOD MDM: CPT

## 2022-01-27 PROCEDURE — 87086 URINE CULTURE/COLONY COUNT: CPT

## 2022-01-27 PROCEDURE — 81025 URINE PREGNANCY TEST: CPT

## 2022-01-27 NOTE — PHYS DOC
Past History


Past Medical History:  UTI, Other


Additional Past Medical Histor:  HEART MURMUR


 (REEMA ADKINS)


Past Surgical History:  No Surgical History, , Tubal ligation, Other


Additional Past Surgical Histo:  UMBRELLA IN HEART


 (REEMA ADKINS)


Smoking:  Non-smoker


Alcohol Use:  None


Drug Use:  None


 (REEMA ADKNIS)





Adult General


Chief Complaint


Chief Complaint:  PAIN ON URINATION





HPI


HPI





Patient is a 31-year-old female presents emergency department concerning urinary

tract infection type signs and symptoms.  Patient reports 3 weeks ago she 

started having intermittent low back pain that has now progressed to daily low 

back pain that radiates to her low abdomen and now is nauseated along with 

pressure with urination.  Patient reports her last menstrual cycle was on 

 with longer duration than normal as she reports a spontaneous 

miscarriage on .  Patient reports this presentation is 

typical and similar to previous urinary tract infections.  Patient reports she 

normally would not have waited this long however was in [place] on vacation, did 

not seek medical care for her symptoms.  Patient denies rashes or lesions to her

vagina, denies vaginal discharge, denies STI concerns.  Patient denies chest 

pains, shortness of breath, fever or chills, denies upper back pain or mid back 

pain, denies seeing blood in her urine or stool.  Patient denies other physical 

complaints or physical concerns.


 (REEMA ADKINS)





Review of Systems


Review of Systems





14 body systems of review of systems have been reviewed.  See HPI for pertinent 

positives and negative responses, otherwise all other systems are negative, 

nonpertinent or noncontributory.


Constitutional: Negative except as outlined in HPI above.


Skin: Negative except as outlined in HPI above.


Eyes:   Negative except as outlined in HPI above.


HENT: Negative except as outlined in HPI above.


Respiratory:   Negative except as outlined in HPI above.


Cardiovascular:   Negative except as outlined in HPI above.


GI:   Negative except as outlined in HPI above.


:  Negative except as outlined in HPI above.


Musculoskeletal:   Negative except as outlined in HPI above.


Integument:   Negative except as outlined in HPI above.


Neurologic:   Negative except as outlined in HPI above.


Endocrine:   Negative except as outlined in HPI above.


Lymphatic:  Negative except as outlined in HPI above.


Psychiatric:  Negative except as outlined in HPI above.


 (REEMA ADKINS)





Allergies


Allergies





Allergies








Coded Allergies Type Severity Reaction Last Updated Verified


 


  hydrocodone Allergy Intermediate  21 Yes


 


  Penicillins Allergy Unknown  21 Yes


 


  amoxicillin Allergy Unknown  12/15/21 Yes








 (REEMA ADKINS)





Physical Exam


Physical Exam





Constitutional: Well developed, well nourished, no acute distress, non-toxic 

appearance.  31-year-old female in no apparent distress.


HENT: Normocephalic, atraumatic.


Eyes: Conjunctiva normal, no discharge.


Neck: Normal range of motion, no stridor.


Cardiovascular: No cyanosis appreciated, distal cap refill less than 2 seconds.


Lungs & Thorax: Patient is in no respiratory distress, no audible adventitious 

lung sounds appreciated.


Abdomen: Nontender, no abnormalities noted.


Skin: Warm, dry, no erythema, no rash.  


Back: No tenderness, no deformities.


Extremities: No tenderness, no cyanosis, no clubbing, ROM intact, no edema.  


Neurologic: Alert and oriented X 3, normal motor function, normal sensory 

function, no focal deficits noted. 


Psychologic: Affect normal, judgement normal, mood normal. 


 (REEMA ADKINS)





Current Patient Data


Vital Signs





                                   Vital Signs








  Date Time  Temp Pulse Resp B/P (MAP) Pulse Ox O2 Delivery O2 Flow Rate FiO2


 


22 20:16 98.2 68 16 114/64 (81) 99 Room Air  








Lab Results





                                Laboratory Tests








Test


 22


20:27


 


POC Urine HCG, Qualitative


 hcg negative


(Negative)








 (REEMA ADKINS)





EKG


EKG


[]


 (REEMA ADKINS)





Radiology/Procedures


Radiology/Procedures


[]


 (REEMA ADKINS)





Heart Score


C/O Chest Pain:  No


Risk Factors:


Risk Factors:  DM, Current or recent (<one month) smoker, HTN, HLP, family 

history of CAD, obesity.


Risk Scores:


Risk Factors:  DM, Current or recent (<one month) smoker, HTN, HLP, family 

history of CAD, obesity.


 (REEMA ADKINS)





Course & Med Decision Making


Course & Med Decision Making


Pertinent Labs and Imaging studies reviewed. (See chart for details)





31-year-old female, vital signs reviewed, resents emerged from concerning 

urinary tract infection type signs and symptoms.  Patient's physical examination

 is unremarkable, will order urinalysis assay with urine pregnancy test.





Patient is not pregnant for urine pregnancy test, the patient's urine is 

infected, will start on antibiotic regimen.





Discussed findings with patient, patient reports she feels nauseated, will give 

p.o. ODT Zofran.





After period of time, patient reports nausea has resolved, will start Keflex 

regimen in ED prior to discharge, discussed antibiotic regimen and side effects,

 return to ER precautions and concerns, follow with primary care for ongoing 

symptoms, patient gave verbal understanding of and is amenable to ED discharge 

planning.





Discussed with the patient all findings and diagnostic testing as well as the 

need to follow-up with their primary care provider for further evaluation and 

treatment or return to the ED if any new or worsening symptoms.  Strict return 

precautions were also discussed at length, the patient voiced understanding and 

agreement with the discharge planning.  The patient was nontoxic in appearance, 

in no apparent distress, and hemodynamically stable at the time of disposition.


 (REEMA ADKINS)


Course & Med Decision Making


Did not see or evaluate patient.  Did not discuss patient with NP.  Agree with 

NP's work-up and disposition per note


 (DARREN KIM MD)


Dragon Disclaimer


Dragon Disclaimer


This electronic medical record was generated, in whole or in part, using a voice

 recognition dictation system.


 (REEMA ADKINS)





Departure


Departure:


Impression:  


   Primary Impression:  


   UTI (urinary tract infection)


Disposition:   HOME / SELF CARE / HOMELESS


Condition:  GOOD


Referrals:  


KEVIN KNIGHT MD (PCP)


Patient Instructions:  Urinary Tract Infection





Additional Instructions:  


You were seen in the emergency department for urinary tract infection.  You are 

not pregnant per urine pregnancy test.  You were started on Keflex, you will 

take this twice a day for the next 5 days.  Return to the emergency department 

for worsening symptoms or other concerns.  Follow with your primary care 

physician for ongoing healthcare needs.  Thank you for visiting our Emergency 

Department.  It was a pleasure taking care of you today in the emergency 

department and we appreciate you trusting us with your care. If any additional 

problems come up don't hesitate to return to visit us. Please follow up with 

your primary care provider so they can plan additional care if needed and know 

about the problem that you had. If symptoms worsen come back to the Emergency 

Department. Any concerning symptoms that start such as chest pain, shortness of 

air, weakness or numbness on one side of the body, running high fevers or any 

other concerning symptoms return to the ER.


Scripts


Cephalexin (KEFLEX) 500 Mg Capsule


1 CAP PO BID for UTI for 7 Days, #14 CAP 0 Refills


   Prov: REEMA ADKINS         22





Problem Qualifiers








   Primary Impression:  


   UTI (urinary tract infection)


   Urinary tract infection type:  site unspecified  Hematuria presence:  with 

   hematuria  Qualified Codes:  N39.0 - Urinary tract infection, site not 

   specified; R31.9 - Hematuria, unspecified








REEMA ADKINS       2022 20:31


DARREN KIM MD               2022 22:07

## 2022-02-06 ENCOUNTER — HOSPITAL ENCOUNTER (EMERGENCY)
Dept: HOSPITAL 63 - ER | Age: 32
Discharge: HOME | End: 2022-02-06
Payer: OTHER GOVERNMENT

## 2022-02-06 VITALS — BODY MASS INDEX: 21.27 KG/M2 | HEIGHT: 64 IN | WEIGHT: 124.56 LBS

## 2022-02-06 VITALS — SYSTOLIC BLOOD PRESSURE: 118 MMHG | DIASTOLIC BLOOD PRESSURE: 58 MMHG

## 2022-02-06 DIAGNOSIS — Z88.0: ICD-10-CM

## 2022-02-06 DIAGNOSIS — Z88.1: ICD-10-CM

## 2022-02-06 DIAGNOSIS — Z87.440: ICD-10-CM

## 2022-02-06 DIAGNOSIS — Z88.5: ICD-10-CM

## 2022-02-06 DIAGNOSIS — N39.0: Primary | ICD-10-CM

## 2022-02-06 LAB
APTT PPP: YELLOW S
BACTERIA #/AREA URNS HPF: (no result) /HPF
BILIRUB UR QL STRIP: (no result)
FIBRINOGEN PPP-MCNC: (no result) MG/DL
GLUCOSE UR STRIP-MCNC: (no result) MG/DL
NITRITE UR QL STRIP: (no result)
RBC #/AREA URNS HPF: (no result) /HPF (ref 0–2)
SP GR UR STRIP: 1.02
SQUAMOUS #/AREA URNS LPF: (no result) /LPF
UROBILINOGEN UR-MCNC: 0.2 MG/DL
WBC #/AREA URNS HPF: >40 /HPF (ref 0–4)

## 2022-02-06 PROCEDURE — 81025 URINE PREGNANCY TEST: CPT

## 2022-02-06 PROCEDURE — 96372 THER/PROPH/DIAG INJ SC/IM: CPT

## 2022-02-06 PROCEDURE — 87186 SC STD MICRODIL/AGAR DIL: CPT

## 2022-02-06 PROCEDURE — 87077 CULTURE AEROBIC IDENTIFY: CPT

## 2022-02-06 PROCEDURE — 99284 EMERGENCY DEPT VISIT MOD MDM: CPT

## 2022-02-06 PROCEDURE — 81001 URINALYSIS AUTO W/SCOPE: CPT

## 2022-02-06 PROCEDURE — 87086 URINE CULTURE/COLONY COUNT: CPT

## 2022-02-06 NOTE — PHYS DOC
Past History


Past Medical History:  UTI, Other


Additional Past Medical Histor:  HEART MURMUR


Past Surgical History:  No Surgical History, , Tubal ligation, Other


Additional Past Surgical Histo:  UMBRELLA IN HEART


Smoking:  Non-smoker


Alcohol Use:  None


Drug Use:  None





Adult General


Chief Complaint


Chief Complaint:  PAIN ON URINATION





HPI


HPI


Patient is a 31-year-old female with a past medical history significant for 

frequent UTIs who presents with a chief complaint of concern for UTI given 

dysuria





Review of Systems


Review of Systems


Review of systems otherwise unremarkable except noted in HPI





Allergies


Allergies





Allergies








Coded Allergies Type Severity Reaction Last Updated Verified


 


  Penicillins Allergy Intermediate  22 Yes


 


  amoxicillin Allergy Intermediate  22 Yes


 


  hydrocodone Allergy Intermediate  21 Yes











Physical Exam


Physical Exam





Constitutional: Well developed, well nourished, no acute distress, non-toxic 

appearance. []


HENT: Normocephalic, atraumatic, 


Neck: Normal range of motion, no tenderness, supple, no stridor. [] 


Cardiovascular:Heart rate regular rhythm, no murmur []


Lungs & Thorax: No respiratory distress


Abdomen:no tenderness, 


Skin: Warm, dry, no erythema, no rash. [] 


Back: no CVA tenderness. [] 


Extremities: No tenderness,  ROM intact, no edema. [] 


Neurologic: Alert and oriented X 3, no focal deficits noted. []


Psychologic: Affect normal, judgement normal, mood normal. []





Current Patient Data


Lab Results





                                Laboratory Tests








Test


 22


03:57


 


POC Urine HCG, Qualitative


 hcg negative


(Negative)











EKG


EKG


[]





Radiology/Procedures


Radiology/Procedures


[]





Heart Score


C/O Chest Pain:  No


Risk Factors:


Risk Factors:  DM, Current or recent (<one month) smoker, HTN, HLP, family 

history of CAD, obesity.


Risk Scores:


Risk Factors:  DM, Current or recent (<one month) smoker, HTN, HLP, family 

history of CAD, obesity.





Course & Med Decision Making


Course & Med Decision Making


Patient is a 31-year-old female who presents with dysuria


Vital signs not concerning.  Physical exam noted above.  No need for pain 

medicine at this time.  No need for nausea medicine.


Urinalysis suggestive of nitrite positive urinary tract infection.  Given first 

dose of antibiotics in the ED.  Sent home with antibiotics.


Discussed all findings with patient.  Advised to follow-up on Monday with 

primary care physician to set up a follow-up visit.


Gave return precautions to the ED.  Patient grateful, verbalized understanding 

and agreed with plan of discharge.





[]





Dragon Disclaimer


Dragon Disclaimer


This electronic medical record was generated, in whole or in part, using a voice

 recognition dictation system.





Departure


Departure:


Impression:  


   Primary Impression:  


   UTI (urinary tract infection)


Disposition:   HOME / SELF CARE / HOMELESS


Condition:  GOOD


Referrals:  


KEVIN KNIGHT MD (PCP)


Patient Instructions:  Urinary Tract Infection





Additional Instructions:  


Thank you for coming into the emergency department tonight and allowing us to 

take care of you.  Please read the attached information carefully to go over thi

ngs we discussed.  Please take your antibiotics as prescribed and until gone.  

Please be sure to drink plenty of fluids.


Please follow-up on Monday with your primary care physician to update on your ED

 visit and set up a follow-up for reevaluation in 1 to 2 weeks.  Please come 

back to the emergency department immediately with new or concerning symptoms as 

we discussed.


Scripts


Ciprofloxacin Hcl (CIPRO) 500 Mg Tablet


1 TAB PO ONCE for UTI for 4 Days, #4 TAB 0 Refills


   Prov: DARREN KIM MD         22











DARREN KIM MD                2022 04:20

## 2022-05-16 ENCOUNTER — HOSPITAL ENCOUNTER (EMERGENCY)
Dept: HOSPITAL 63 - ER | Age: 32
LOS: 1 days | Discharge: HOME | End: 2022-05-17
Payer: OTHER GOVERNMENT

## 2022-05-16 VITALS — BODY MASS INDEX: 21.27 KG/M2 | WEIGHT: 124.56 LBS | HEIGHT: 64 IN

## 2022-05-16 DIAGNOSIS — N39.0: Primary | ICD-10-CM

## 2022-05-16 DIAGNOSIS — F41.0: ICD-10-CM

## 2022-05-16 DIAGNOSIS — E87.6: ICD-10-CM

## 2022-05-16 DIAGNOSIS — Z98.51: ICD-10-CM

## 2022-05-16 LAB
ALBUMIN SERPL-MCNC: 3.9 G/DL (ref 3.4–5)
ALBUMIN/GLOB SERPL: 1.1 {RATIO} (ref 1–1.7)
ALP SERPL-CCNC: 63 U/L (ref 46–116)
ALT SERPL-CCNC: 23 U/L (ref 14–59)
ANION GAP SERPL CALC-SCNC: 11 MMOL/L (ref 6–14)
AST SERPL-CCNC: 16 U/L (ref 15–37)
BASOPHILS # BLD AUTO: 0 X10^3/UL (ref 0–0.2)
BASOPHILS NFR BLD: 1 % (ref 0–3)
BILIRUB SERPL-MCNC: 0.3 MG/DL (ref 0.2–1)
BUN/CREAT SERPL: 12 (ref 6–20)
CA-I SERPL ISE-MCNC: 13 MG/DL (ref 7–20)
CALCIUM SERPL-MCNC: 8.8 MG/DL (ref 8.5–10.1)
CHLORIDE SERPL-SCNC: 104 MMOL/L (ref 98–107)
CO2 SERPL-SCNC: 25 MMOL/L (ref 21–32)
CREAT SERPL-MCNC: 1.1 MG/DL (ref 0.6–1)
EOSINOPHIL NFR BLD: 0.1 X10^3/UL (ref 0–0.7)
EOSINOPHIL NFR BLD: 2 % (ref 0–3)
ERYTHROCYTE [DISTWIDTH] IN BLOOD BY AUTOMATED COUNT: 13 % (ref 11.5–14.5)
GFR SERPLBLD BASED ON 1.73 SQ M-ARVRAT: 57.2 ML/MIN
GLOBULIN SER-MCNC: 3.4 G/DL (ref 2.2–3.8)
GLUCOSE SERPL-MCNC: 121 MG/DL (ref 70–99)
HCT VFR BLD CALC: 36 % (ref 36–47)
HGB BLD-MCNC: 12.2 G/DL (ref 12–15.5)
LYMPHOCYTES # BLD: 4.7 X10^3/UL (ref 1–4.8)
LYMPHOCYTES NFR BLD AUTO: 57 % (ref 24–48)
MAGNESIUM SERPL-MCNC: 1.9 MG/DL (ref 1.8–2.4)
MCH RBC QN AUTO: 31 PG (ref 25–35)
MCHC RBC AUTO-ENTMCNC: 34 G/DL (ref 31–37)
MCV RBC AUTO: 90 FL (ref 79–100)
MONO #: 0.5 X10^3/UL (ref 0–1.1)
MONOCYTES NFR BLD: 6 % (ref 0–9)
NEUT #: 2.9 X10^3UL (ref 1.8–7.7)
NEUTROPHILS NFR BLD AUTO: 35 % (ref 31–73)
PLATELET # BLD AUTO: 213 X10^3/UL (ref 140–400)
POTASSIUM SERPL-SCNC: 2.9 MMOL/L (ref 3.5–5.1)
PROT SERPL-MCNC: 7.3 G/DL (ref 6.4–8.2)
RBC # BLD AUTO: 3.99 X10^6/UL (ref 3.5–5.4)
SODIUM SERPL-SCNC: 140 MMOL/L (ref 136–145)
WBC # BLD AUTO: 8.2 X10^3/UL (ref 4–11)

## 2022-05-16 PROCEDURE — 36415 COLL VENOUS BLD VENIPUNCTURE: CPT

## 2022-05-16 PROCEDURE — 80307 DRUG TEST PRSMV CHEM ANLYZR: CPT

## 2022-05-16 PROCEDURE — 81025 URINE PREGNANCY TEST: CPT

## 2022-05-16 PROCEDURE — 87086 URINE CULTURE/COLONY COUNT: CPT

## 2022-05-16 PROCEDURE — 85025 COMPLETE CBC W/AUTO DIFF WBC: CPT

## 2022-05-16 PROCEDURE — 80053 COMPREHEN METABOLIC PANEL: CPT

## 2022-05-16 PROCEDURE — 99285 EMERGENCY DEPT VISIT HI MDM: CPT

## 2022-05-16 PROCEDURE — 83735 ASSAY OF MAGNESIUM: CPT

## 2022-05-16 PROCEDURE — 96360 HYDRATION IV INFUSION INIT: CPT

## 2022-05-16 PROCEDURE — 93005 ELECTROCARDIOGRAM TRACING: CPT

## 2022-05-16 PROCEDURE — 81001 URINALYSIS AUTO W/SCOPE: CPT

## 2022-05-16 PROCEDURE — 70450 CT HEAD/BRAIN W/O DYE: CPT

## 2022-05-16 PROCEDURE — 87186 SC STD MICRODIL/AGAR DIL: CPT

## 2022-05-16 PROCEDURE — 87077 CULTURE AEROBIC IDENTIFY: CPT

## 2022-05-16 PROCEDURE — 82550 ASSAY OF CK (CPK): CPT

## 2022-05-16 NOTE — PHYS DOC
Past History


Past Medical History:  UTI, Other


Additional Past Medical Histor:  HEART MURMUR


Past Surgical History:  , Tubal ligation, Other


Additional Past Surgical Histo:  UMBRELLA IN HEART


Smoking:  Non-smoker


Alcohol Use:  None


Drug Use:  None





General Adult


EDM:


Chief Complaint:  SEIZURE





HPI:


HPI:





Patient is a 31-year-old female brought in by  for possible seizure-like 

activity.  He states that she had been having a panic attack and was shaking, 

states her eyes rolled back and last about 20 seconds.  Patient did not bite her

tongue or lose control of bowel or bladder.  No known seizure history.  Patient 

ate 2 cannabis edibles about 1 hour prior to symptoms.   states that she 

has been getting anxious about how she was feeling and has never used marijuana 

edibles before.  Patient was recently switched to Wellbutrin and have been 

tolerating it well.





Review of Systems:


Review of Systems:


All other systems within normal limits except for as noted in the HPI





Allergies:


Allergies:





Allergies








Coded Allergies Type Severity Reaction Last Updated Verified


 


  Penicillins Allergy Intermediate  22 Yes


 


  amoxicillin Allergy Intermediate  22 Yes


 


  hydrocodone Allergy Intermediate  22 Yes











Physical Exam:


PE:


Constitutional: Well developed, well nourished, no acute distress, non-toxic 

appearance. []


HENT: Normocephalic, atraumatic, bilateral external ears normal,  nose normal. 

[]


Eyes: PERRLA, conjunctiva normal, no discharge. [] 


Neck: No rigidity, supple, no stridor. [] 


Cardiovascular: Regular rate and rhythm, brisk cap refill []


Lungs & Thorax: Non labored symmetric respirations, no tachypnea or respiratory 

distress []


Abdomen: Soft, nondistended.


Skin: Warm, dry, no erythema, no rash. [] 


Back: Unremarkable


Extremities: No deformities, range of motion grossly intact, no lower extremity 

edema [] 


Neurologic: Alert and oriented X 3, no focal deficits noted. []


Psychologic: Affect normal, judgement normal, mood normal. []





Current Patient Data:


Vital Signs:





                                   Vital Signs








  Date Time  Temp Pulse Resp B/P (MAP) Pulse Ox O2 Delivery O2 Flow Rate FiO2


 


22 22:50 97.8 108 18 111/62 (78) 98 Room Air  











EKG:


EKG:


[]





Radiology/Procedures:


Radiology/Procedures:


CT head normal []





Heart Score:


C/O Chest Pain:  No


Risk Factors:


Risk Factors:  DM, Current or recent (<one month) smoker, HTN, HLP, family 

history of CAD, obesity.


Risk Scores:


Score 0 - 3:  2.5% MACE over next 6 weeks - Discharge Home


Score 4 - 6:  20.3% MACE over next 6 weeks - Admit for Clinical Observation


Score 7 - 10:  72.7% MACE over next 6 weeks - Early Invasive Strategies





Course & Med Decision Making:


Course & Med Decision Making


Pertinent Labs and Imaging studies reviewed. (See chart for details)





[]





Dragon Disclaimer:


Dragon Disclaimer:


This electronic medical record was generated, in whole or in part, using a voice

 recognition dictation system.





Departure


Departure:


Impression:  


   Primary Impression:  


   UTI (urinary tract infection)


   Additional Impressions:  


   Panic attack


   Hypokalemia


Disposition:   HOME / SELF CARE / HOMELESS


Condition:  IMPROVED


Referrals:  


KEVIN KNIGHT MD (PCP)


Patient Instructions:  Hypokalemia


Scripts


Cephalexin (CEPHALEXIN) 500 Mg Tablet


1 TAB PO BID for antibiotic for 7 Days, #14 TAB


   Prov: MARTIN VERDUGO MD         22 


Potassium Bicarbonate/Cit Ac (EFFER-K 20 MEQ TABLET EFF) 20 Meq Tablet.eff


1 TAB PO DAILY for potassium for 10 Days, #10 TAB 0 Refills


   Prov: MARTIN VERDUGO MD         22











MARTIN VERDUGO MD            May 16, 2022 23:27

## 2022-05-17 VITALS — DIASTOLIC BLOOD PRESSURE: 80 MMHG | SYSTOLIC BLOOD PRESSURE: 113 MMHG

## 2022-05-17 LAB
AMPHETAMINE/METHAMPHETAMINE: (no result)
APTT PPP: YELLOW S
BACTERIA #/AREA URNS HPF: (no result) /HPF
BARBITURATES UR-MCNC: (no result) UG/ML
BENZODIAZ UR-MCNC: (no result) UG/L
CANNABINOIDS UR-MCNC: (no result) UG/L
COCAINE UR-MCNC: (no result) NG/ML
FIBRINOGEN PPP-MCNC: CLEAR MG/DL
GLUCOSE UR STRIP-MCNC: (no result) MG/DL
METHADONE SERPL-MCNC: (no result) NG/ML
NITRITE UR QL STRIP: (no result)
OPIATES UR-MCNC: (no result) NG/ML
PCP SERPL-MCNC: (no result) MG/DL
RBC #/AREA URNS HPF: (no result) /HPF (ref 0–2)
SP GR UR STRIP: >=1.03
SQUAMOUS #/AREA URNS LPF: (no result) /LPF
UROBILINOGEN UR-MCNC: 0.2 MG/DL

## 2022-05-17 NOTE — RAD
RS Compliance Statement:



One or more of the following individualized dose reduction techniques were utilized for this examinat
ion:  

1. Automated exposure control  

2. Adjustment of the mA and/or kV according to patient size  

3. Use of iterative reconstruction technique



CT head without contrast 5/17/2022



INDICATION: New onset seizure activity



COMPARISON: None available



TECHNIQUE: Multiple axial CT images of the head were obtained from skull base through the vertex with
out intravenous contrast. 



FINDINGS:



Head:



Ventricles, sulci and basal cisterns are within normal limits. There is no hydrocephalus. Gray-white 
matter differentiation is normal. There is no acute intracranial hemorrhage. There is no mass, mass e
ffect or midline shift. Posterior fossa is normal in appearance.



Visualized portions of the orbits are normal. Paranasal sinuses are well aerated. Mastoid air cells a
re well aerated. Scalp and calvaria are normal.



IMPRESSION:



No acute intracranial hemorrhage.



Electronically signed by: Siomara Hardin MD (5/17/2022 12:53 AM) [facility]